# Patient Record
Sex: MALE | Race: WHITE
[De-identification: names, ages, dates, MRNs, and addresses within clinical notes are randomized per-mention and may not be internally consistent; named-entity substitution may affect disease eponyms.]

---

## 2018-03-08 ENCOUNTER — HOSPITAL ENCOUNTER (EMERGENCY)
Dept: HOSPITAL 62 - ER | Age: 63
Discharge: HOME | End: 2018-03-08
Payer: OTHER GOVERNMENT

## 2018-03-08 VITALS — SYSTOLIC BLOOD PRESSURE: 124 MMHG | DIASTOLIC BLOOD PRESSURE: 86 MMHG

## 2018-03-08 DIAGNOSIS — E11.9: ICD-10-CM

## 2018-03-08 DIAGNOSIS — R05: ICD-10-CM

## 2018-03-08 DIAGNOSIS — J84.89: Primary | ICD-10-CM

## 2018-03-08 DIAGNOSIS — Z99.81: ICD-10-CM

## 2018-03-08 LAB
ADD MANUAL DIFF: NO
ANION GAP SERPL CALC-SCNC: 17 MMOL/L (ref 5–19)
BASOPHILS # BLD AUTO: 0.1 10^3/UL (ref 0–0.2)
BASOPHILS NFR BLD AUTO: 0.7 % (ref 0–2)
BUN SERPL-MCNC: 19 MG/DL (ref 7–20)
CALCIUM: 9.5 MG/DL (ref 8.4–10.2)
CHLORIDE SERPL-SCNC: 98 MMOL/L (ref 98–107)
CO2 SERPL-SCNC: 26 MMOL/L (ref 22–30)
EOSINOPHIL # BLD AUTO: 0.3 10^3/UL (ref 0–0.6)
EOSINOPHIL NFR BLD AUTO: 4 % (ref 0–6)
ERYTHROCYTE [DISTWIDTH] IN BLOOD BY AUTOMATED COUNT: 14.7 % (ref 11.5–14)
GLUCOSE SERPL-MCNC: 155 MG/DL (ref 75–110)
HCT VFR BLD CALC: 35.3 % (ref 37.9–51)
HGB BLD-MCNC: 11.7 G/DL (ref 13.5–17)
LYMPHOCYTES # BLD AUTO: 1.2 10^3/UL (ref 0.5–4.7)
LYMPHOCYTES NFR BLD AUTO: 17 % (ref 13–45)
MCH RBC QN AUTO: 27.2 PG (ref 27–33.4)
MCHC RBC AUTO-ENTMCNC: 33.3 G/DL (ref 32–36)
MCV RBC AUTO: 82 FL (ref 80–97)
MONOCYTES # BLD AUTO: 0.8 10^3/UL (ref 0.1–1.4)
MONOCYTES NFR BLD AUTO: 11.7 % (ref 3–13)
NEUTROPHILS # BLD AUTO: 4.7 10^3/UL (ref 1.7–8.2)
NEUTS SEG NFR BLD AUTO: 66.6 % (ref 42–78)
PLATELET # BLD: 327 10^3/UL (ref 150–450)
POTASSIUM SERPL-SCNC: 3.5 MMOL/L (ref 3.6–5)
RBC # BLD AUTO: 4.32 10^6/UL (ref 4.35–5.55)
SODIUM SERPL-SCNC: 140.5 MMOL/L (ref 137–145)
TOTAL CELLS COUNTED % (AUTO): 100 %
WBC # BLD AUTO: 7.1 10^3/UL (ref 4–10.5)

## 2018-03-08 PROCEDURE — 87040 BLOOD CULTURE FOR BACTERIA: CPT

## 2018-03-08 PROCEDURE — 99285 EMERGENCY DEPT VISIT HI MDM: CPT

## 2018-03-08 PROCEDURE — 71046 X-RAY EXAM CHEST 2 VIEWS: CPT

## 2018-03-08 PROCEDURE — 93005 ELECTROCARDIOGRAM TRACING: CPT

## 2018-03-08 PROCEDURE — 80048 BASIC METABOLIC PNL TOTAL CA: CPT

## 2018-03-08 PROCEDURE — 94640 AIRWAY INHALATION TREATMENT: CPT

## 2018-03-08 PROCEDURE — 93010 ELECTROCARDIOGRAM REPORT: CPT

## 2018-03-08 PROCEDURE — 36415 COLL VENOUS BLD VENIPUNCTURE: CPT

## 2018-03-08 PROCEDURE — 85025 COMPLETE CBC W/AUTO DIFF WBC: CPT

## 2018-03-08 NOTE — ER DOCUMENT REPORT
ED Medical Screen (RME)





- General


Chief Complaint: Shortness Of Breath


Stated Complaint: COUGH


Time Seen by Provider: 03/08/18 11:35


Notes: 





RME DISCLOSURE


I have seen this patient as part of a Rapid Medical Evaluation and, if 

applicable, placed any initially appropriate orders. The patient will be seen 

and fully evaluated, including a full history and physical exam, by a provider (

in Main ED or Fast Track) when a room becomes available.





------------------------------------------------------------------





62-year-old male PMH interstitial pneumonitis (on CellCept therapy and daily 

prednisone) here with complaints of cough, shortness of breath, wheezing, fevers

/chills, generalized weakness ongoing for the past 1-2 weeks and progressively 

worsening.  He does wear home oxygen for his interstitial pneumonitis.  He does 

also have a history of pneumonia.  He is primarily here today because his cough 

has become significantly worse in the past few days.





EXAM


Mild to moderate diffuse end expiratory wheezes bilaterally


Mildly diminished aeration


Tachycardic, low 100s


TRAVEL OUTSIDE OF THE [place] IN LAST 30 DAYS: No





- Related Data


Allergies/Adverse Reactions: 


 





No Known Allergies Allergy (Verified 03/08/18 11:24)


 











Past Medical History





- Social History


Chew tobacco use (# tins/day): No


Frequency of alcohol use: None


Drug Abuse: None


Endocrine Medical History: Reports: Hx Diabetes Mellitus Type 2


Renal/ Medical History: Denies: Hx Peritoneal Dialysis


GI Medical History: Reports: Hx Gastroesophageal Reflux Disease


Musculoskeltal Medical History: Reports Hx Arthritis


Past Surgical History: Reports: Hx Cardiac Catheterization, Hx Cardiac Surgery 

- ablasion





- Immunizations


Immunizations up to date: Yes


Hx Diphtheria, Pertussis, Tetanus Vaccination: Yes





Physical Exam





- Vital signs


Vitals: 





 











Temp Pulse Resp BP Pulse Ox


 


 97.8 F   109 H  18   148/72 H  97 


 


 03/08/18 11:30  03/08/18 11:30  03/08/18 11:30  03/08/18 11:30  03/08/18 11:30














Course





- Vital Signs


Vital signs: 





 











Temp Pulse Resp BP Pulse Ox


 


 97.8 F   109 H  18   148/72 H  97 


 


 03/08/18 11:30  03/08/18 11:30  03/08/18 11:30  03/08/18 11:30  03/08/18 11:30

## 2018-03-08 NOTE — RADIOLOGY REPORT (SQ)
EXAM DESCRIPTION:  CHEST PA/LAT



COMPLETED DATE/TIME:  3/8/2018 12:20 pm



REASON FOR STUDY:  cough fever; eval pneumonia



COMPARISON:  5/27/2014.



EXAM PARAMETERS:  NUMBER OF VIEWS: two views

TECHNIQUE: Digital Frontal and Lateral radiographic views of the chest acquired.

RADIATION DOSE: NA

LIMITATIONS: none



FINDINGS:  LUNGS AND PLEURA: Chronic interstitial changes.  Stable mild focal scarring in the right m
id lung.  Pleural thickening in the apices.  No focal infiltrates, masses or pneumothorax. No pleural
 effusion.

MEDIASTINUM AND HILAR STRUCTURES: No masses or contour abnormalities.

HEART AND VASCULAR STRUCTURES: Heart normal size.  No evidence for failure.

BONES: No acute findings.

HARDWARE: None in the chest.

OTHER: No other significant finding.



IMPRESSION:  STABLE CHRONIC PLEURAL AND PARENCHYMAL SCARRING.  NO ACUTE RADIOGRAPHIC FINDING IN THE C
HEST.



TECHNICAL DOCUMENTATION:  JOB ID:  [id number]

 2011 [employer]- All Rights Reserved



Reading location - IP/workstation name: [email or web address]

## 2018-03-08 NOTE — EKG REPORT
SEVERITY:- ABNORMAL ECG -

SINUS RHYTHM

INCOMPLETE RIGHT BUNDLE BRANCH BLOCK

:

Confirmed by: Cathy Hernandez 08-Mar-2018 22:06:41

## 2018-03-08 NOTE — ER DOCUMENT REPORT
ED General





- General


Chief Complaint: Shortness Of Breath


Stated Complaint: COUGH


Time Seen by Provider: 03/08/18 11:35


TRAVEL OUTSIDE OF THE [place] IN LAST 30 DAYS: No





- HPI


Patient complains to provider of: coughing


Notes: 





62-year-old male with chronic interstitial pneumonitis, chronically wearing 

oxygen presents with cough for 2 weeks.  This cough is been increasing in 

intensity over the last 10 days.  Patient also has recent exposure to a young 

child with pneumonia.  Patient denies fever chills chest pain, nausea, vomiting.





- Related Data


Allergies/Adverse Reactions: 


 





No Known Allergies Allergy (Verified 03/08/18 11:24)


 











Past Medical History





- Social History


Smoking Status: Never Smoker


Chew tobacco use (# tins/day): No


Frequency of alcohol use: None


Drug Abuse: None


Family History: Reviewed & Not Pertinent


Patient has suicidal ideation: No


Patient has homicidal ideation: No


Endocrine Medical History: Reports: Hx Diabetes Mellitus Type 2


Renal/ Medical History: Denies: Hx Peritoneal Dialysis


GI Medical History: Reports: Hx Gastroesophageal Reflux Disease


Musculoskeltal Medical History: Reports Hx Arthritis


Past Surgical History: Reports: Hx Cardiac Catheterization, Hx Cardiac Surgery 

- ablasion





- Immunizations


Immunizations up to date: Yes


Hx Diphtheria, Pertussis, Tetanus Vaccination: Yes





Review of Systems





- Review of Systems


Notes: 





REVIEW OF SYSTEMS:


CONSTITUTIONAL: -fevers, -chills


EENT: -eye pain, -difficulty swallowing, -nasal congestion


CARDIOVASCULAR: -chest pain, -syncope.


RESPIRATORY: Increased cough


GASTROINTESTINAL: -abdominal pain, -nausea, -vomiting, -diarrhea


GENITOURINARY: -dysuria, -hematuria


MUSCULOSKELETAL: -back pain, -neck pain


SKIN: -rash or skin lesions.


HEMATOLOGIC: -easy bruising or bleeding.


LYMPHATIC: -swollen, enlarged glands.


NEUROLOGICAL: -altered mental status or loss of consciousness, -headache, -

neurologic symptoms


PSYCHIATRIC: -anxiety, -depression.


ALL OTHER SYSTEMS REVIEWED AND NEGATIVE.





Physical Exam





- Vital signs


Vitals: 


 











Temp Pulse Resp BP Pulse Ox


 


 97.8 F   109 H  18   148/72 H  97 


 


 03/08/18 11:30  03/08/18 11:30  03/08/18 11:30  03/08/18 11:30  03/08/18 11:30














- Notes


Notes: 





PHYSICAL EXAMINATION:


GENERAL: Well-appearing, well-nourished and in no acute distress.


HEAD: Atraumatic, normocephalic.


EYES: Pupils equal round and reactive to light, extraocular movements intact, 

sclera anicteric, conjunctiva are normal.


ENT: nares patent, oropharynx clear without exudates.  Moist mucous membranes.


NECK: Normal range of motion, supple without lymphadenopathy


LUNGS: Breath sounds clear to auscultation bilaterally and equal.  No wheezes 

rales or rhonchi.


HEART: Regular rate and rhythm without murmurs


ABDOMEN: Soft, nontender, normoactive bowel sounds.  No guarding, no rebound.  

No masses appreciated.


EXTREMITIES: Normal range of motion, no pitting or edema.  No cyanosis.


NEUROLOGICAL: Cranial nerves grossly intact.  Normal speech, normal gait.  

Normal sensory and motor exams.


PSYCH: Normal mood, normal affect.


SKIN: Warm, Dry, normal turgor, no rashes or lesions noted.





Course





- Re-evaluation


Re-evalutation: 





03/08/18 12:45


Well-appearing man in no acute distress stable vital signs within normal 

limits.  Extensive lab workup unremarkable, negative for leukocytosis.





Chest x-ray is no focal infiltrate patient was concern he had a pneumonia.  

Near his baseline oxygen demand.  Will be discharged home follow-up strict 

return because





- Vital Signs


Vital signs: 


 











Temp Pulse Resp BP Pulse Ox


 


 97.8 F   109 H  18   148/72 H  97 


 


 03/08/18 11:30  03/08/18 11:30  03/08/18 11:30  03/08/18 11:30  03/08/18 11:30














- Laboratory


Result Diagrams: 


 03/08/18 11:55





 03/08/18 11:55


Laboratory results interpreted by me: 


 











  03/08/18 03/08/18





  11:55 11:55


 


RBC  4.32 L 


 


Hgb  11.7 L 


 


Hct  35.3 L 


 


RDW  14.7 H 


 


Potassium   3.5 L


 


Glucose   155 H














Discharge





- Discharge


Clinical Impression: 


 Cough





Condition: Good


Instructions:  Cough Suppressant & Expectorant Medications


Additional Instructions: 


See your PCP

## 2018-03-20 ENCOUNTER — HOSPITAL ENCOUNTER (INPATIENT)
Dept: HOSPITAL 62 - ER | Age: 63
LOS: 4 days | Discharge: ANOTHER HOSPITAL | DRG: 871 | End: 2018-03-24
Attending: INTERNAL MEDICINE | Admitting: INTERNAL MEDICINE
Payer: OTHER GOVERNMENT

## 2018-03-20 DIAGNOSIS — J15.1: ICD-10-CM

## 2018-03-20 DIAGNOSIS — A41.9: Primary | ICD-10-CM

## 2018-03-20 DIAGNOSIS — R74.8: ICD-10-CM

## 2018-03-20 DIAGNOSIS — I11.0: ICD-10-CM

## 2018-03-20 DIAGNOSIS — J84.9: ICD-10-CM

## 2018-03-20 DIAGNOSIS — M19.90: ICD-10-CM

## 2018-03-20 DIAGNOSIS — Z79.52: ICD-10-CM

## 2018-03-20 DIAGNOSIS — E87.6: ICD-10-CM

## 2018-03-20 DIAGNOSIS — Z79.899: ICD-10-CM

## 2018-03-20 DIAGNOSIS — I50.33: ICD-10-CM

## 2018-03-20 DIAGNOSIS — E66.01: ICD-10-CM

## 2018-03-20 DIAGNOSIS — E11.40: ICD-10-CM

## 2018-03-20 DIAGNOSIS — Z79.4: ICD-10-CM

## 2018-03-20 DIAGNOSIS — Z66: ICD-10-CM

## 2018-03-20 DIAGNOSIS — I27.20: ICD-10-CM

## 2018-03-20 DIAGNOSIS — J96.01: ICD-10-CM

## 2018-03-20 DIAGNOSIS — K21.9: ICD-10-CM

## 2018-03-20 LAB
ADD MANUAL DIFF: NO
ALBUMIN SERPL-MCNC: 3.4 G/DL (ref 3.5–5)
ALP SERPL-CCNC: 123 U/L (ref 38–126)
ALT SERPL-CCNC: 33 U/L (ref 21–72)
ANION GAP SERPL CALC-SCNC: 16 MMOL/L (ref 5–19)
ARTERIAL BLOOD FIO2: (no result)
ARTERIAL BLOOD H2CO3: 0.96 MMOL/L (ref 1.05–1.35)
ARTERIAL BLOOD HCO3: 22.1 MMOL/L (ref 20–26)
ARTERIAL BLOOD PCO2: 31.8 MMHG (ref 35–45)
ARTERIAL BLOOD PH: 7.46 (ref 7.35–7.45)
ARTERIAL BLOOD PO2: 82.6 MMHG (ref 80–100)
ARTERIAL BLOOD TOTAL CO2: 23 MMOL/L (ref 23–27)
AST SERPL-CCNC: 26 U/L (ref 17–59)
BASE EXCESS BLDA CALC-SCNC: -1.1 MMOL/L
BASE EXCESS BLDV CALC-SCNC: 0.7 MMOL/L
BASOPHILS # BLD AUTO: 0.1 10^3/UL (ref 0–0.2)
BASOPHILS NFR BLD AUTO: 0.5 % (ref 0–2)
BILIRUB DIRECT SERPL-MCNC: 0.6 MG/DL (ref 0–0.4)
BILIRUB SERPL-MCNC: 0.8 MG/DL (ref 0.2–1.3)
BUN SERPL-MCNC: 13 MG/DL (ref 7–20)
CALCIUM: 8.8 MG/DL (ref 8.4–10.2)
CHLORIDE SERPL-SCNC: 101 MMOL/L (ref 98–107)
CO2 SERPL-SCNC: 23 MMOL/L (ref 22–30)
EOSINOPHIL # BLD AUTO: 0.2 10^3/UL (ref 0–0.6)
EOSINOPHIL NFR BLD AUTO: 1.3 % (ref 0–6)
ERYTHROCYTE [DISTWIDTH] IN BLOOD BY AUTOMATED COUNT: 15.3 % (ref 11.5–14)
GLUCOSE SERPL-MCNC: 184 MG/DL (ref 75–110)
HCO3 BLDV-SCNC: 24.8 MMOL/L (ref 20–32)
HCT VFR BLD CALC: 30.5 % (ref 37.9–51)
HGB BLD-MCNC: 9.9 G/DL (ref 13.5–17)
LYMPHOCYTES # BLD AUTO: 1 10^3/UL (ref 0.5–4.7)
LYMPHOCYTES NFR BLD AUTO: 5.9 % (ref 13–45)
MCH RBC QN AUTO: 26.6 PG (ref 27–33.4)
MCHC RBC AUTO-ENTMCNC: 32.4 G/DL (ref 32–36)
MCV RBC AUTO: 82 FL (ref 80–97)
MONOCYTES # BLD AUTO: 1.4 10^3/UL (ref 0.1–1.4)
MONOCYTES NFR BLD AUTO: 7.8 % (ref 3–13)
NEUTROPHILS # BLD AUTO: 14.7 10^3/UL (ref 1.7–8.2)
NEUTS SEG NFR BLD AUTO: 84.5 % (ref 42–78)
PCO2 BLDV: 38.3 MMHG (ref 35–63)
PH BLDV: 7.43 [PH] (ref 7.3–7.42)
PLATELET # BLD: 506 10^3/UL (ref 150–450)
POTASSIUM SERPL-SCNC: 3.2 MMOL/L (ref 3.6–5)
PROT SERPL-MCNC: 6.5 G/DL (ref 6.3–8.2)
RBC # BLD AUTO: 3.72 10^6/UL (ref 4.35–5.55)
SAO2 % BLDA: 96.8 % (ref 94–98)
SODIUM SERPL-SCNC: 139.6 MMOL/L (ref 137–145)
TOTAL CELLS COUNTED % (AUTO): 100 %
WBC # BLD AUTO: 17.4 10^3/UL (ref 4–10.5)

## 2018-03-20 PROCEDURE — 87077 CULTURE AEROBIC IDENTIFY: CPT

## 2018-03-20 PROCEDURE — 82962 GLUCOSE BLOOD TEST: CPT

## 2018-03-20 PROCEDURE — 87070 CULTURE OTHR SPECIMN AEROBIC: CPT

## 2018-03-20 PROCEDURE — 85025 COMPLETE CBC W/AUTO DIFF WBC: CPT

## 2018-03-20 PROCEDURE — 36600 WITHDRAWAL OF ARTERIAL BLOOD: CPT

## 2018-03-20 PROCEDURE — 87205 SMEAR GRAM STAIN: CPT

## 2018-03-20 PROCEDURE — 94660 CPAP INITIATION&MGMT: CPT

## 2018-03-20 PROCEDURE — 99285 EMERGENCY DEPT VISIT HI MDM: CPT

## 2018-03-20 PROCEDURE — 87040 BLOOD CULTURE FOR BACTERIA: CPT

## 2018-03-20 PROCEDURE — 83735 ASSAY OF MAGNESIUM: CPT

## 2018-03-20 PROCEDURE — 94640 AIRWAY INHALATION TREATMENT: CPT

## 2018-03-20 PROCEDURE — 83605 ASSAY OF LACTIC ACID: CPT

## 2018-03-20 PROCEDURE — 93306 TTE W/DOPPLER COMPLETE: CPT

## 2018-03-20 PROCEDURE — 83880 ASSAY OF NATRIURETIC PEPTIDE: CPT

## 2018-03-20 PROCEDURE — 84484 ASSAY OF TROPONIN QUANT: CPT

## 2018-03-20 PROCEDURE — 71250 CT THORAX DX C-: CPT

## 2018-03-20 PROCEDURE — 36415 COLL VENOUS BLD VENIPUNCTURE: CPT

## 2018-03-20 PROCEDURE — 80053 COMPREHEN METABOLIC PANEL: CPT

## 2018-03-20 PROCEDURE — 82803 BLOOD GASES ANY COMBINATION: CPT

## 2018-03-20 PROCEDURE — 82565 ASSAY OF CREATININE: CPT

## 2018-03-20 PROCEDURE — 93005 ELECTROCARDIOGRAM TRACING: CPT

## 2018-03-20 PROCEDURE — 71045 X-RAY EXAM CHEST 1 VIEW: CPT

## 2018-03-20 PROCEDURE — 93010 ELECTROCARDIOGRAM REPORT: CPT

## 2018-03-20 PROCEDURE — 96374 THER/PROPH/DIAG INJ IV PUSH: CPT

## 2018-03-20 PROCEDURE — 80202 ASSAY OF VANCOMYCIN: CPT

## 2018-03-20 PROCEDURE — 87186 SC STD MICRODIL/AGAR DIL: CPT

## 2018-03-20 RX ADMIN — FLUOXETINE SCH MG: 20 CAPSULE ORAL at 22:36

## 2018-03-20 RX ADMIN — INSULIN GLARGINE SCH UNIT: 100 INJECTION, SOLUTION SUBCUTANEOUS at 22:38

## 2018-03-20 RX ADMIN — MYCOPHENOLATE MOFETIL SCH MG: 250 CAPSULE ORAL at 22:36

## 2018-03-20 RX ADMIN — SODIUM CHLORIDE PRN ML: 9 INJECTION, SOLUTION INTRAVENOUS at 15:19

## 2018-03-20 RX ADMIN — Medication SCH ML: at 23:04

## 2018-03-20 RX ADMIN — SODIUM CHLORIDE PRN ML: 9 INJECTION, SOLUTION INTRAVENOUS at 14:40

## 2018-03-20 RX ADMIN — MONTELUKAST SODIUM SCH MG: 10 TABLET, FILM COATED ORAL at 22:36

## 2018-03-20 RX ADMIN — CEFEPIME HYDROCHLORIDE SCH ML: 1 INJECTION, SOLUTION INTRAVENOUS at 22:36

## 2018-03-20 NOTE — PDOC H&P
History of Present Illness


Admission Date/PCP: 


  03/20/18 15:29





  


[facility]


Patient complains of: Shortness of breath and cough


History of Present Illness: 


SHALINI PARKER is a 62 year old male presents to hospital with complaint of 

shortness of breath and cough for the last several days.  Wife reports that 

patient was seen at [place] by Dr. Rivera who placed patient on CellCept and 

decrease patient's steroids.  Patient states that by the end of January he 

began having more respiratory issues.  Wife reports that patient's breathing 

continued to worsen into February and recently they came to the emergency room 

for evaluation.  Wife reports that they were told that was most likely due to 

allergies and patient was sent home from the emergency department.  Family 

represented today because of patient not feeling better.  Wife also reported 

that patient has been febrile at home.  Wife reports that temperature has been 

from 100.2F -103.6. Pt states that sat decreased to 75% with exertion. 





Conference call was held with the ER hospitalist and [facility] pulmonary service who 

recommended that patient be transferred to their facility but due to them being 

on diversion recommended that patient stay here in our hospital until bed is 

available.  [place] pulmonary doctor Matthew recommended that patient be placed on 

Bactrim for PCP treatment, steroids, and continue with current antibiotics.








Past Medical History


Endocrine Medical History: Reports: Diabetes Mellitus Type 2 - neuropathy


GI Medical History: Reports: Gastroesophageal Reflux Disease


Musculoskeltal Medical History: Reports: Arthritis





Past Surgical History


Past Surgical History: Reports: Cardiac Catheterization





Social History


Information Source: Patient


Lives with: Family


Smoking Status: Never Smoker


Frequency of Alcohol Use: None


Hx Recreational Drug Use: No


Drugs: None


Hx Prescription Drug Abuse: No





- Advance Directive


Resuscitation Status: Do Not Resuscitate





Family History


Family History: Reviewed & Not Pertinent


Parental Family History Reviewed: Yes


Children Family History Reviewed: Yes


Sibling(s) Family History Reviewed.: Yes





Medication/Allergy


Home Medications: 








Oxycodone HCl/Acetaminophen [Percocet 5-325 mg Tablet] 1 - 2 tab PO ASDIR PRN #

60 tablet 05/27/14 


Oxycodone HCl/Acetaminophen [Percocet 5-325 mg Tablet] 1 - 2 tab PO ASDIR PRN #

15 tablet 10/12/16 








Allergies/Adverse Reactions: 


 





No Known Allergies Allergy (Verified 03/20/18 13:24)


 











Review of Systems


Constitutional: PRESENT: weakness.  ABSENT: chills, fever(s), headache(s), 

weight gain, weight loss


Eyes: ABSENT: visual disturbances


Ears: ABSENT: hearing changes


Cardiovascular: ABSENT: chest pain, dyspnea on exertion, edema, orthropnea, 

palpitations


Respiratory: PRESENT: cough, dyspnea


Gastrointestinal: ABSENT: abdominal pain, constipation, diarrhea, hematemesis, 

hematochezia, nausea, vomiting


Genitourinary: ABSENT: dysuria, hematuria


Musculoskeletal: ABSENT: joint swelling


Integumentary: ABSENT: rash, wounds


Neurological: ABSENT: abnormal gait, abnormal speech, confusion, dizziness, 

focal weakness, syncope


Psychiatric: ABSENT: anxiety, depression, homidical ideation, suicidal ideation


Endocrine: ABSENT: cold intolerance, heat intolerance, polydipsia, polyuria


Hematologic/Lymphatic: ABSENT: easy bleeding, easy bruising





Physical Exam


Vital Signs: 


 











Temp Pulse Resp BP Pulse Ox


 


       30 H  136/66 H  95 


 


       03/20/18 17:00  03/20/18 16:01  03/20/18 17:00











General appearance: PRESENT: morbidly obese, well-developed, well-nourished


Head exam: PRESENT: atraumatic, normocephalic


Eye exam: PRESENT: conjunctiva pink, EOMI.  ABSENT: scleral icterus


Ear exam: PRESENT: normal external ear exam


Mouth exam: PRESENT: moist, tongue midline


Neck exam: ABSENT: carotid bruit, JVD, lymphadenopathy, thyromegaly


Respiratory exam: PRESENT: accessory muscle use, prolonged expiratory phas, 

retraction, rhonchi, tachypnea, unlabored, wheezes


Cardiovascular exam: PRESENT: RRR.  ABSENT: diastolic murmur, rubs, systolic 

murmur


Pulses: PRESENT: normal dorsalis pedis pul


Vascular exam: PRESENT: normal capillary refill


GI/Abdominal exam: PRESENT: normal bowel sounds, soft.  ABSENT: distended, 

guarding, mass, organolmegaly, rebound, tenderness


Rectal exam: PRESENT: deferred


Extremities exam: PRESENT: full ROM.  ABSENT: calf tenderness, clubbing, pedal 

edema


Musculoskeletal exam: PRESENT: full ROM


Neurological exam: PRESENT: alert, awake, oriented to person, oriented to place

, oriented to time, oriented to situation, CN II-XII grossly intact.  ABSENT: 

motor sensory deficit


Psychiatric exam: PRESENT: appropriate affect, normal mood.  ABSENT: homicidal 

ideation, suicidal ideation


Skin exam: PRESENT: dry, intact, warm.  ABSENT: cyanosis, rash





Results


Impressions: 


 





Chest X-Ray  03/20/18 14:21


IMPRESSION:  Chronic bandlike scarring right mid lung


New alveolar and interstitial infiltrates at both lung bases, worrisome for 

alveolar and interstitial edema.  Pneumonia could not entirely be excluded


 














Assessment & Plan





- Diagnosis


(1) Sepsis


Qualifiers: 


   Sepsis type: sepsis due to unspecified organism   Qualified Code(s): A41.9 - 

Sepsis, unspecified organism   


Is this a current diagnosis for this admission?: Yes   


Plan: 


Febrile, respiratory rate, tachypnea, identified source Secondary to Nosocomial 

Pneumonia: Vancomycin and Cefepime.  Will place on Tamiflu. 








(2) Pneumonia


Is this a current diagnosis for this admission?: Yes   


Plan: 


We will continue vancomycin and cefepime.  Patient placed on Bactrim for PCP








(3) Acute respiratory failure with hypoxia


Is this a current diagnosis for this admission?: Yes   


Plan: 


We will check ABG.  Have consulted pulmonary.  Patient on 5 L nasal cannula.  

We will continue patient on antibiotics.








(4) Interstitial pneumonia


Is this a current diagnosis for this admission?: Yes   


Plan: 


Interstitial Pneumonitis:  Will continue Steroids and Cellcept.  Will place on 

Bactrim treatment dose for PJP. 








(5) Morbid obesity


Is this a current diagnosis for this admission?: Yes   


Plan: 


Encourage dietary changes. 








(6) Hypokalemia


Is this a current diagnosis for this admission?: Yes   


Plan: 


Will give potassium replacement.  Will check BMP in am








(7) Diabetes


Is this a current diagnosis for this admission?: Yes   


Plan: 


Will place on SSI. 








(8) DVT prophylaxis


Is this a current diagnosis for this admission?: Yes   


Plan: 


SCDs








- Time


Time Spent: 30 to 50 Minutes

## 2018-03-20 NOTE — RADIOLOGY REPORT (SQ)
EXAM DESCRIPTION:  CT CHEST WITHOUT



COMPLETED DATE/TIME:  3/20/2018 7:01 pm



REASON FOR STUDY:  Pneumonia



COMPARISON:  Radiograph 3/20/2018



TECHNIQUE:  CT scan performed of the chest without intravenous contrast.  Images reviewed with lung, 
soft tissue and bone windows.  Reconstructed coronal and sagittal MPR images reviewed.  All images st
ored on PACS.

All CT scanners at this facility use dose modulation, iterative reconstruction, and/or weight based d
osing when appropriate to reduce radiation dose to as low as reasonably achievable (ALARA).

[facility]: Dose Right  [facility]: CareDose    [facility]: Dose Right    [facility]: Teradose 4D    [facility]: Smart Technologies



RADIATION DOSE:  CT Rad equipment meets quality standard of care and radiation dose reduction techniq
ues were employed. CTDIvol: 17.9 mGy. DLP: 701 mGy-cm. mGy.



LIMITATIONS:  No technical limitations.



FINDINGS:  LUNGS AND PLEURA: There is mild ground-glass opacification in left upper lobe and in the r
ight upper lobe.  More prominent infiltrates are present in the lower lobes, particularly on the righ
t side.  Air bronchograms are seen.

HILAR AND MEDIASTINAL STRUCTURES: Mild mediastinal adenopathy, likely reactive.

HEART AND VASCULAR STRUCTURES: No aneurysm.  No pericardial effusion.

UPPER ABDOMEN: No significant findings.  Limited exam.

THYROID AND OTHER SOFT TISSUES: No masses.  No adenopathy.

BONES: No significant finding.

HARDWARE: None in the chest.

OTHER: No other significant findings.



IMPRESSION:  Multicentric pneumonia most prominently involving the right lower lobe.  Mild mediastina
l adenopathy.



TECHNICAL DOCUMENTATION:  JOB ID:  [id number]

Quality ID # [id number]: Final reports with documentation of one or more dose reduction techniques (e.g., Au
tomated exposure control, adjustment of the mA and/or kV according to patient size, use of iterative 
reconstruction technique)

 2011 [employer]- All Rights Reserved



Reading location - IP/workstation name: [email or web address]

## 2018-03-20 NOTE — RADIOLOGY REPORT (SQ)
EXAM DESCRIPTION:  CHEST SINGLE VIEW



COMPLETED DATE/TIME:  3/20/2018 2:56 pm



REASON FOR STUDY:  fever, tachy, productive cough, hx interstitial pn



COMPARISON:  Chest films 3/8/2018, 5/27/2014



EXAM PARAMETERS:  NUMBER OF VIEWS: One view.

TECHNIQUE: Single frontal radiographic view of the chest acquired.

RADIATION DOSE: NA

LIMITATIONS: None.



FINDINGS:  LUNGS AND PLEURA: There is bilateral lower lobe airspace disease with Kerley lines worriso
me for fluid overload or congestive failure.

Patient has bandlike scarring in the right mid lung along the minor fissure, stable.

No pleural effusion.  No pneumothorax.

MEDIASTINUM AND HILAR STRUCTURES: No masses.  Contour normal.

HEART AND VASCULAR STRUCTURES: No cardiomegaly

BONES: No acute findings.

HARDWARE: None in the chest.

OTHER: No other significant finding.



IMPRESSION:  Chronic bandlike scarring right mid lung

New alveolar and interstitial infiltrates at both lung bases, worrisome for alveolar and interstitial
 edema.  Pneumonia could not entirely be excluded



TECHNICAL DOCUMENTATION:  JOB ID:  [id number]

 2011 [employer]- All Rights Reserved



Reading location - IP/workstation name: [facility]

## 2018-03-20 NOTE — ER DOCUMENT REPORT
ED General





- General


Chief Complaint: Productive Cough


Stated Complaint: FEVER COUGH


Time Seen by Provider: 03/20/18 13:51


Mode of Arrival: Wheelchair


Information source: Patient, Relative


Notes: 





62-year-old male history of interstitial pneumonitis who has been on steroids 

for the past year presents with 3 week duration of difficulty breathing.  

Patient was seen here a few weeks prior told he may be allergic since he was 

afebrile at that time, patient notes that he is normally at 2-3 L baseline 

nasal cannula but has gone up to 5 L recently has been having fevers and 

continued productive green cough





family notes when ambulating sats go down ot 75%


TRAVEL OUTSIDE OF THE [place] IN LAST 30 DAYS: No





- HPI


Onset: Other


Onset/Duration: Persistent, Worse


Quality of pain: Achy


Severity: Moderate


Pain Level: 1


Associated symptoms: Productive cough, Fever, Nausea, Shortness of breath


Exacerbated by: Walking


Relieved by: Denies


Similar symptoms previously: Yes


Recently seen / treated by doctor: Yes





- Related Data


Allergies/Adverse Reactions: 


 





No Known Allergies Allergy (Verified 03/20/18 13:24)


 











Past Medical History





- General


Information source: Patient





- Social History


Smoking Status: Never Smoker


Cigarette use (# per day): No


Chew tobacco use (# tins/day): No


Smoking Education Provided: No


Family History: Reviewed & Not Pertinent


Patient has suicidal ideation: No


Patient has homicidal ideation: No


Endocrine Medical History: Reports: Hx Diabetes Mellitus Type 2 - neuropathy


Renal/ Medical History: Denies: Hx Peritoneal Dialysis


GI Medical History: Reports: Hx Gastroesophageal Reflux Disease


Musculoskeltal Medical History: Reports Hx Arthritis


Past Surgical History: Reports: Hx Cardiac Catheterization, Hx Cardiac Surgery 

- ablasion





- Immunizations


Immunizations up to date: Yes


Hx Diphtheria, Pertussis, Tetanus Vaccination: Yes





Review of Systems





- Review of Systems


Notes: 





REVIEW OF SYSTEMS:


CONSTITUTIONAL : Admits to fevers


EENT:   Denies eye, ear, throat, or mouth pain or symptoms.  Denies nasal or 

sinus congestion or discharge.  Denies throat, tongue, or mouth swelling or 

difficulty swallowing.


CARDIOVASCULAR:  Denies chest pain.  Denies palpitations or racing or irregular 

heart beat.  Denies ankle edema.


RESPIRATORY: Admits to cough congestion productive shortness of breath


GASTROINTESTINAL:  Denies abdominal pain or distention.  Denies nausea, vomiting

, or diarrhea.  Denies blood in vomitus, stools, or per rectum.  Denies black, 

tarry stools.  Denies constipation.  


GENITOURINARY:  Denies difficulty urinating, painful urination, burning, 

frequency, blood in urine, or discharge.


MUSCULOSKELETAL:  Denies back or neck pain or stiffness.  Denies joint pain or 

swelling.


SKIN:   Denies rash, lesions or sores.


HEMATOLOGIC :   Denies easy bruising or bleeding.


LYMPHATIC:  Denies swollen, enlarged glands.


NEUROLOGICAL:  Denies confusion or altered mental status.  Denies passing out 

or loss of consciousness.  Denies dizziness or lightheadedness.  Denies 

headache.  Denies weakness or paralysis or loss of use of either side.  Denies 

problems with gait or speech.  Denies sensory loss, numbness, or tingling.  

Denies seizures.


PSYCHIATRIC:  Denies anxiety or stress.  Denies depression, suicidal ideation, 

or homicidal ideation.





ALL OTHER SYSTEMS REVIEWED AND NEGATIVE.





Dictation was performed using Dragon voice recognition software 





PHYSICAL EXAMINATION:





GENERAL: Overall well-appearing but noted to be febrile





HEAD: Atraumatic, normocephalic.





EYES: Pupils equal round and reactive to light, extraocular movements intact, 

sclera anicteric, conjunctiva are normal.





ENT: Nares patent, oropharynx clear without exudates.  Moist mucous membranes.





NECK: Normal range of motion, supple without lymphadenopathy





LUNGS: Coarse wheezing all throughout





HEART: Tachycardia





ABDOMEN: Soft, nontender, nondistended abdomen.  No guarding, no rebound.  No 

masses appreciated.





Musculoskeletal: Normal range of motion, no pitting or edema.  No cyanosis.





NEUROLOGICAL: Cranial nerves grossly intact.  Normal speech, normal gait.  

Normal sensory, motor exams 





PSYCH: Normal mood, normal affect.





SKIN: Warm, Dry, normal turgor, no rashes or lesions noted.





Course





- Re-evaluation


Re-evalutation: 





03/20/18 14:28


pt overall looks well, but noted to by febrile, tachycardia , hypoxemia on O2, 

will order sepsis workup up, antibitiocs, fluids , imaging pending for probable 

penumona. 





- Laboratory


Result Diagrams: 


 03/20/18 14:41





 03/20/18 14:41


Laboratory results interpreted by me: 


 











  03/20/18 03/20/18 03/20/18





  14:41 14:41 14:41


 


WBC  17.4 H  


 


RBC  3.72 L  


 


Hgb  9.9 L  


 


Hct  30.5 L  


 


MCH  26.6 L  


 


RDW  15.3 H  


 


Plt Count  506 H  


 


Seg Neutrophils %  84.5 H  


 


Lymphocytes %  5.9 L  


 


Absolute Neutrophils  14.7 H  


 


VBG pH    7.43 H


 


Potassium   3.2 L 


 


Glucose   184 H 


 


Direct Bilirubin   0.6 H 


 


Albumin   3.4 L 














- Diagnostic Test


Radiology reviewed: Image reviewed, Reports reviewed - pneumonia





- EKG Interpretation by Me


EKG shows normal: Sinus rhythm, Axis, Intervals, QRS Complexes


Rate: Tachycardia





Critical Care Note





- Critical Care Note


Total time excluding time spent on procedures (mins): 46


Comments: 





46 minutes of critical care time spent in direct contact evaluating and 

reevaluating the patient, treating symptoms, reviewing labs and studies and 

speaking with family  and consultants excluding any procedures





Discharge





- Discharge


Clinical Impression: 


 Hypokalemia





Pneumonia


Qualifiers:


 Pneumonia type: due to unspecified organism Laterality: right Lung location: 

lower lobe of lung Qualified Code(s): J18.1 - Lobar pneumonia, unspecified 

organism





Sepsis


Qualifiers:


 Sepsis type: sepsis due to unspecified organism Qualified Code(s): A41.9 - 

Sepsis, unspecified organism





Condition: Fair


Disposition: ADMITTED AS INPATIENT


Admitting Provider: Hospitalist


Unit Admitted: Telemetry

## 2018-03-20 NOTE — ER DOCUMENT REPORT
ED Medical Screen (RME)





- General


Chief Complaint: Productive Cough


Stated Complaint: FEVER COUGH


Time Seen by Provider: 03/20/18 13:51


Mode of Arrival: Wheelchair


Information source: Patient


Notes: 





This is a 62-year-old oxygen dependent, immune compromised man with 

hypersensitivity pneumonitis (followed at [facility]) who presents to the emergency 

room with fever 102-103, productive cough of brown sputum and oxygen 

desaturations.


TRAVEL OUTSIDE OF THE [place] IN LAST 30 DAYS: No





- Related Data


Allergies/Adverse Reactions: 


 





No Known Allergies Allergy (Verified 03/20/18 13:24)


 











Past Medical History


Endocrine Medical History: Reports: Hx Diabetes Mellitus Type 2 - neuropathy


Renal/ Medical History: Denies: Hx Peritoneal Dialysis


GI Medical History: Reports: Hx Gastroesophageal Reflux Disease


Musculoskeltal Medical History: Reports Hx Arthritis


Past Surgical History: Reports: Hx Cardiac Catheterization, Hx Cardiac Surgery 

- ablasion





- Immunizations


Immunizations up to date: Yes


Hx Diphtheria, Pertussis, Tetanus Vaccination: Yes

## 2018-03-21 LAB
ABSOLUTE LYMPHOCYTES# (MANUAL): 0.9 10^3/UL (ref 0.5–4.7)
ABSOLUTE MONOCYTES # (MANUAL): 1.7 10^3/UL (ref 0.1–1.4)
ABSOLUTE NEUTROPHILS# (MANUAL): 10.3 10^3/UL (ref 1.7–8.2)
ADD MANUAL DIFF: YES
ALBUMIN SERPL-MCNC: 2.6 G/DL (ref 3.5–5)
ALP SERPL-CCNC: 97 U/L (ref 38–126)
ALT SERPL-CCNC: 28 U/L (ref 21–72)
ANION GAP SERPL CALC-SCNC: 9 MMOL/L (ref 5–19)
ANISOCYTOSIS BLD QL SMEAR: SLIGHT
AST SERPL-CCNC: 19 U/L (ref 17–59)
BASOPHILS NFR BLD MANUAL: 0 % (ref 0–2)
BILIRUB DIRECT SERPL-MCNC: 0.4 MG/DL (ref 0–0.4)
BILIRUB SERPL-MCNC: 0.6 MG/DL (ref 0.2–1.3)
BUN SERPL-MCNC: 9 MG/DL (ref 7–20)
CALCIUM: 8.2 MG/DL (ref 8.4–10.2)
CHLORIDE SERPL-SCNC: 110 MMOL/L (ref 98–107)
CO2 SERPL-SCNC: 23 MMOL/L (ref 22–30)
EOSINOPHIL NFR BLD MANUAL: 1 % (ref 0–6)
ERYTHROCYTE [DISTWIDTH] IN BLOOD BY AUTOMATED COUNT: 15.4 % (ref 11.5–14)
GLUCOSE SERPL-MCNC: 142 MG/DL (ref 75–110)
HCT VFR BLD CALC: 26.2 % (ref 37.9–51)
HGB BLD-MCNC: 8.8 G/DL (ref 13.5–17)
MCH RBC QN AUTO: 27.4 PG (ref 27–33.4)
MCHC RBC AUTO-ENTMCNC: 33.4 G/DL (ref 32–36)
MCV RBC AUTO: 82 FL (ref 80–97)
METAMYELOCYTES % (MANUAL): 1 %
MONOCYTES % (MANUAL): 13 % (ref 3–13)
PLATELET # BLD: 423 10^3/UL (ref 150–450)
PLATELET COMMENT: ADEQUATE
POLYCHROMASIA BLD QL SMEAR: SLIGHT
POTASSIUM SERPL-SCNC: 3.6 MMOL/L (ref 3.6–5)
PROT SERPL-MCNC: 5.2 G/DL (ref 6.3–8.2)
RBC # BLD AUTO: 3.2 10^6/UL (ref 4.35–5.55)
ROULEAUX BLD QL SMEAR: (no result)
SEGMENTED NEUTROPHILS % (MAN): 78 % (ref 42–78)
SODIUM SERPL-SCNC: 141.9 MMOL/L (ref 137–145)
TOTAL CELLS COUNTED BLD: 100
TOXIC GRANULES BLD QL SMEAR: (no result)
VARIANT LYMPHS NFR BLD MANUAL: 7 % (ref 13–45)
WBC # BLD AUTO: 13.1 10^3/UL (ref 4–10.5)

## 2018-03-21 PROCEDURE — 3E0F73Z INTRODUCTION OF ANTI-INFLAMMATORY INTO RESPIRATORY TRACT, VIA NATURAL OR ARTIFICIAL OPENING: ICD-10-PCS | Performed by: INTERNAL MEDICINE

## 2018-03-21 RX ADMIN — SULFAMETHOXAZOLE AND TRIMETHOPRIM SCH TAB: 800; 160 TABLET ORAL at 15:24

## 2018-03-21 RX ADMIN — FERROUS SULFATE TAB 325 MG (65 MG ELEMENTAL FE) SCH MG: 325 (65 FE) TAB at 11:17

## 2018-03-21 RX ADMIN — MYCOPHENOLATE MOFETIL SCH MG: 250 CAPSULE ORAL at 21:30

## 2018-03-21 RX ADMIN — INSULIN LISPRO PRN UNIT: 100 INJECTION, SOLUTION INTRAVENOUS; SUBCUTANEOUS at 21:46

## 2018-03-21 RX ADMIN — INSULIN GLARGINE SCH UNIT: 100 INJECTION, SOLUTION SUBCUTANEOUS at 21:33

## 2018-03-21 RX ADMIN — TIOTROPIUM BROMIDE SCH CAP: 18 CAPSULE ORAL; RESPIRATORY (INHALATION) at 09:11

## 2018-03-21 RX ADMIN — MONTELUKAST SODIUM SCH MG: 10 TABLET, FILM COATED ORAL at 21:30

## 2018-03-21 RX ADMIN — VANCOMYCIN HYDROCHLORIDE SCH MG: 1 INJECTION, POWDER, LYOPHILIZED, FOR SOLUTION INTRAVENOUS at 09:11

## 2018-03-21 RX ADMIN — ALBUTEROL SULFATE PRN MG: 2.5 SOLUTION RESPIRATORY (INHALATION) at 08:27

## 2018-03-21 RX ADMIN — INSULIN LISPRO SCH UNIT: 100 INJECTION, SOLUTION INTRAVENOUS; SUBCUTANEOUS at 08:03

## 2018-03-21 RX ADMIN — ALBUTEROL SULFATE PRN MG: 2.5 SOLUTION RESPIRATORY (INHALATION) at 14:27

## 2018-03-21 RX ADMIN — Medication SCH ML: at 21:37

## 2018-03-21 RX ADMIN — FUROSEMIDE SCH MG: 20 TABLET ORAL at 08:01

## 2018-03-21 RX ADMIN — Medication SCH ML: at 13:19

## 2018-03-21 RX ADMIN — GABAPENTIN SCH MG: 300 CAPSULE ORAL at 11:17

## 2018-03-21 RX ADMIN — OSELTAMIVIR PHOSPHATE SCH MG: 75 CAPSULE ORAL at 18:29

## 2018-03-21 RX ADMIN — METHYLPREDNISOLONE SODIUM SUCCINATE SCH MG: 40 INJECTION, POWDER, FOR SOLUTION INTRAMUSCULAR; INTRAVENOUS at 09:09

## 2018-03-21 RX ADMIN — BACLOFEN SCH MG: 10 TABLET ORAL at 18:28

## 2018-03-21 RX ADMIN — CEFEPIME HYDROCHLORIDE SCH ML: 1 INJECTION, SOLUTION INTRAVENOUS at 21:32

## 2018-03-21 RX ADMIN — BACLOFEN SCH MG: 10 TABLET ORAL at 09:10

## 2018-03-21 RX ADMIN — MYCOPHENOLATE MOFETIL SCH MG: 250 CAPSULE ORAL at 09:10

## 2018-03-21 RX ADMIN — Medication SCH ML: at 05:49

## 2018-03-21 RX ADMIN — ALBUTEROL SULFATE PRN MG: 2.5 SOLUTION RESPIRATORY (INHALATION) at 20:54

## 2018-03-21 RX ADMIN — INSULIN LISPRO SCH UNIT: 100 INJECTION, SOLUTION INTRAVENOUS; SUBCUTANEOUS at 11:19

## 2018-03-21 RX ADMIN — SULFAMETHOXAZOLE AND TRIMETHOPRIM SCH TAB: 800; 160 TABLET ORAL at 21:29

## 2018-03-21 RX ADMIN — LANSOPRAZOLE SCH MG: 30 TABLET, ORALLY DISINTEGRATING, DELAYED RELEASE ORAL at 05:49

## 2018-03-21 RX ADMIN — CEFEPIME HYDROCHLORIDE SCH ML: 1 INJECTION, SOLUTION INTRAVENOUS at 10:45

## 2018-03-21 RX ADMIN — VANCOMYCIN HYDROCHLORIDE SCH MG: 1 INJECTION, POWDER, LYOPHILIZED, FOR SOLUTION INTRAVENOUS at 18:29

## 2018-03-21 RX ADMIN — FLUTICASONE PROPIONATE SCH SPRAY: 50 SPRAY, METERED NASAL at 09:10

## 2018-03-21 RX ADMIN — FLUOXETINE SCH MG: 20 CAPSULE ORAL at 21:30

## 2018-03-21 RX ADMIN — VANCOMYCIN HYDROCHLORIDE SCH MG: 1 INJECTION, POWDER, LYOPHILIZED, FOR SOLUTION INTRAVENOUS at 01:19

## 2018-03-21 RX ADMIN — INSULIN LISPRO SCH UNIT: 100 INJECTION, SOLUTION INTRAVENOUS; SUBCUTANEOUS at 18:29

## 2018-03-21 NOTE — EKG REPORT
SEVERITY:- ABNORMAL ECG -

SINUS TACHYCARDIA

INCOMPLETE RIGHT BUNDLE BRANCH BLOCK

:

Confirmed by: Cathy Hernandez 21-Mar-2018 11:03:49

## 2018-03-21 NOTE — XCELERA REPORT
[address] [address]

                             Tel: [phone]

                             Fax: [phone]



                    Transthoracic Echocardiogram Report

____________________________________________________________________________



Name: SHALINI PARKER

MRN: [id number]                Age: 62 yrs

Gender: Male                   : 1955

Patient Status: Inpatient      Patient Location: ICU^1^A

Account #: [id number]

Study Date: 2018 09:35 AM

Accession #: [id number]

____________________________________________________________________________



Height: 75 in        Weight: 246 lb        BSA: 2.4 m2



____________________________________________________________________________

Procedure: A complete two-dimensional transthoracic echocardiogram was

performed (2D, M-mode, spectral and color flow Doppler). The study was

technically difficult with many images being suboptimal in quality.

Reason For Study: Shortness of breath





Ordering Physician: ABBEY COONEY

Performed By: Brittni Whitaker

____________________________________________________________________________





Interpretation Summary

The left ventricular ejection fraction is normal.

Doppler measurements suggest pseudonormalized left ventricular relaxation,

which is associated with grade II/IV or mild to moderate diastolic

dysfunction

There is mild concentric left ventricular hypertrophy.

The left ventricle is grossly normal size.

Wall motion cannot be accurately commented on, but no definite regional

wall motion abnormalities noted.

The right ventricle is moderately dilated.

The right ventricle appears to be hypertrophied

The right ventricular systolic function is borderline reduced.

The right atrium is mildly dilated.

The left atrial size is normal.

There is a trace to mild amount of mitral regurgitation

There is no mitral valve stenosis.

No aortic regurgitation is present.

There is no aortic valve stenosis

There is a trace to mild amount of tricuspid regurgitation

There is mild to moderate pulmonary hypertension by echo

Best estimated RVSP is approximately 40-45 mm/Hg.

The aortic root is not well visualized but is probably normal size.

The inferior vena cava appeared normal and decreased > 50% with respiration

(RAP 5-10 mmHg)

There is no pericardial effusion.



____________________________________________________________________________



MMode/2D Measurements & Calculations

RVDd: 2.7 cm  LVIDd: 4.4 cm  FS: 33.4 %            Ao root diam: 3.3 cm

IVSd: 1.3 cm  LVIDs: 3.0 cm  EDV(Teich): 89.3 ml

              LVPWd: 1.3 cm  ESV(Teich): 33.6 ml   Ao root area: 8.3 cm2

                             EF(Teich): 62.3 %



Doppler Measurements & Calculations

MV E max huey:      MV dec slope:       Ao V2 max:        LV V1 max P.2 cm/sec                            163.4 cm/sec      6.3 mmHg

MV A max huey:      559.4 cm/sec2       Ao max PG:        LV V1 max:

110.4 cm/sec       MV dec time:        10.7 mmHg         125.1 cm/sec

MV E/A: 0.84       0.17 sec



        _____________________________________________________________

MR max huey:        PA V2 max:          TR max huey:

550.4 cm/sec       78.8 cm/sec         257.9 cm/sec

MR max PG:         PA max P.5 mmHg TR max P.2 mmHg                             27.2 mmHg



____________________________________________________________________________

Left Ventricle

The left ventricle is grossly normal size. There is mild concentric left

ventricular hypertrophy. The left ventricular ejection fraction is normal.

Doppler measurements suggest pseudonormalized left ventricular relaxation,

which is associated with grade II/IV or mild to moderate diastolic

dysfunction. Wall motion cannot be accurately commented on, but no definite

regional wall motion abnormalities noted.



Right Ventricle

The right ventricle is moderately dilated. The right ventricle appears to

be hypertrophied. The right ventricular systolic function is borderline

reduced.



Atria

The right atrium is mildly dilated. The left atrial size is normal.

Interarterial septum not well visualized and not well dopplered. Cannot

comment on ASD/PFO presence.





Mitral Valve

The mitral valve is grossly normal. There is no mitral valve stenosis.

There is a trace to mild amount of mitral regurgitation.



Aortic Valve

The aortic valve is grossly normal. There is no aortic valve stenosis. No

aortic regurgitation is present.



Tricuspid Valve

The tricuspid valve is not well visualized secondary to technical

limitations. There is a trace to mild amount of tricuspid regurgitation.

There is mild to moderate pulmonary hypertension by echo. Best estimated

RVSP is approximately 40-45 mm/Hg.



Pulmonic Valve

The pulmonic valve is not well visualized.



Great Vessels

The aortic root is not well visualized but is probably normal size. The

inferior vena cava appeared normal and decreased > 50% with respiration

(RAP 5-10 mmHg).





Effusions

There is no pericardial effusion.



____________________________________________________________________________



Electronically signed by:      Cathy Hernandez      on 2018 01:11 PM



CC: ABBEY COONEY

>

Cathy Hernandez

## 2018-03-21 NOTE — PDOC PROGRESS REPORT
Subjective


Progress Note for:: 03/21/18


Subjective:: 


Pt states that he is feeling a little better this morning.  Spoke to [facility] 

transfer line who states that they are hoping to have a bed later this 

afternoon.





Reason For Visit: 


ACUTE AND CHRONIC HYPOXIC RESPIRATORY FAILURE,








Physical Exam


Vital Signs: 


 











Temp Pulse Resp BP Pulse Ox


 


 99.7 F   124 H  34 H  146/78 H  94 


 


 03/21/18 06:00  03/20/18 20:26  03/21/18 06:00  03/21/18 05:24  03/21/18 06:00








 Intake & Output











 03/20/18 03/21/18 03/22/18





 06:59 06:59 06:59


 


Intake Total  700 


 


Output Total  850 


 


Balance  -150 


 


Weight  111.9 kg 











General appearance: PRESENT: no acute distress, morbidly obese, well-developed, 

well-nourished


Head exam: PRESENT: atraumatic, normocephalic


Eye exam: PRESENT: conjunctiva pink, EOMI.  ABSENT: scleral icterus


Ear exam: PRESENT: normal external ear exam


Mouth exam: PRESENT: moist, tongue midline


Neck exam: ABSENT: carotid bruit, JVD, lymphadenopathy, thyromegaly


Respiratory exam: PRESENT: prolonged expiratory phas, rhonchi, wheezes


Cardiovascular exam: PRESENT: RRR.  ABSENT: diastolic murmur, rubs, systolic 

murmur


Pulses: PRESENT: normal dorsalis pedis pul


Vascular exam: PRESENT: normal capillary refill


GI/Abdominal exam: PRESENT: normal bowel sounds, soft.  ABSENT: distended, 

guarding, mass, organolmegaly, rebound, tenderness


Rectal exam: PRESENT: deferred


Extremities exam: PRESENT: full ROM.  ABSENT: calf tenderness, clubbing, pedal 

edema


Musculoskeletal exam: PRESENT: full ROM


Neurological exam: PRESENT: alert, awake, oriented to person, oriented to place

, oriented to time, oriented to situation, CN II-XII grossly intact.  ABSENT: 

motor sensory deficit


Psychiatric exam: PRESENT: appropriate affect, normal mood.  ABSENT: homicidal 

ideation, suicidal ideation


Skin exam: PRESENT: dry, intact, warm.  ABSENT: cyanosis, rash





Results


Laboratory Results: 


 











  03/20/18





  21:15


 


Carbonic Acid  0.96 L


 


HCO3/H2CO3 Ratio  23:1


 


ABG pH  7.46 H


 


ABG pCO2  31.8 L


 


ABG pO2  82.6


 


ABG HCO3  22.1


 


ABG O2 Saturation  96.8


 


ABG Base Excess  -1.1


 


FiO2  5L








 











  03/20/18 03/21/18





  19:05 01:02


 


Troponin I  0.062  0.056











Impressions: 


 





Chest CT  03/20/18 00:00


IMPRESSION:  Multicentric pneumonia most prominently involving the right lower 

lobe.  Mild mediastinal adenopathy.


 








Chest X-Ray  03/20/18 14:21


IMPRESSION:  Chronic bandlike scarring right mid lung


New alveolar and interstitial infiltrates at both lung bases, worrisome for 

alveolar and interstitial edema.  Pneumonia could not entirely be excluded


 














Assessment & Plan





- Diagnosis


(1) Sepsis


Qualifiers: 


   Sepsis type: sepsis due to unspecified organism   Qualified Code(s): A41.9 - 

Sepsis, unspecified organism   


Is this a current diagnosis for this admission?: Yes   


Plan: 


Febrile, respiratory rate, tachypnea, identified source Secondary to Nosocomial 

Pneumonia: Vancomycin and Cefepime. Will continue Tamiflu. 








(2) Pneumonia


Is this a current diagnosis for this admission?: Yes   


Plan: 


We will continue vancomycin and cefepime.  Patient placed on Bactrim for PCP








(3) Acute respiratory failure with hypoxia


Is this a current diagnosis for this admission?: Yes   


Plan: 


Pulmonary Consulted.  Patient on 5 L nasal cannula.  We will continue patient 

on antibiotics.








(4) Dyspnea


Is this a current diagnosis for this admission?: Yes   


Plan: 


Acute on chronic secondary to interstitial lung disease: We will obtain a 2D 

echo to evaluate patient's heart function.  BMP ordered.  Patient was not 

continued on maintenance IV fluids due to patient receiving 3 L of normal 

saline and emergency room department.








(5) Interstitial pneumonia


Is this a current diagnosis for this admission?: Yes   


Plan: 


Interstitial Pneumonitis:  Will continue current antibiotics.  Will continue 

bactrim for PCP 








(6) Morbid obesity


Is this a current diagnosis for this admission?: Yes   


Plan: 


Encourage dietary changes. 








(7) Hypokalemia


Is this a current diagnosis for this admission?: Yes   


Plan: 


CMP pending. 








(8) Diabetes


Is this a current diagnosis for this admission?: Yes   


Plan: 


Will continue on SSI. 








(9) DVT prophylaxis


Is this a current diagnosis for this admission?: Yes   


Plan: 


SCDs








- Time


Time Spent with patient: 35 or more minutes - Spoke to do transfer line who has 

stated that patient could possibly be transferred to their facility later 

today.  Will wait for further information.  Appreciate Dr. Yash Ying who has 

graciously accepted patient at [facility].

## 2018-03-22 LAB
%HYPO/RBC NFR BLD AUTO: SLIGHT %
ABSOLUTE LYMPHOCYTES# (MANUAL): 0.3 10^3/UL (ref 0.5–4.7)
ABSOLUTE MONOCYTES # (MANUAL): 0.6 10^3/UL (ref 0.1–1.4)
ABSOLUTE NEUTROPHILS# (MANUAL): 13.3 10^3/UL (ref 1.7–8.2)
ADD MANUAL DIFF: YES
ALBUMIN SERPL-MCNC: 2.8 G/DL (ref 3.5–5)
ALP SERPL-CCNC: 110 U/L (ref 38–126)
ALT SERPL-CCNC: 33 U/L (ref 21–72)
ANION GAP SERPL CALC-SCNC: 14 MMOL/L (ref 5–19)
ANISOCYTOSIS BLD QL SMEAR: SLIGHT
AST SERPL-CCNC: 20 U/L (ref 17–59)
BASOPHILS NFR BLD MANUAL: 0 % (ref 0–2)
BILIRUB DIRECT SERPL-MCNC: 0.2 MG/DL (ref 0–0.4)
BILIRUB SERPL-MCNC: 0.2 MG/DL (ref 0.2–1.3)
BUN SERPL-MCNC: 14 MG/DL (ref 7–20)
BURR CELLS BLD QL SMEAR: SLIGHT
CALCIUM: 8.7 MG/DL (ref 8.4–10.2)
CHLORIDE SERPL-SCNC: 106 MMOL/L (ref 98–107)
CO2 SERPL-SCNC: 21 MMOL/L (ref 22–30)
EOSINOPHIL NFR BLD MANUAL: 0 % (ref 0–6)
ERYTHROCYTE [DISTWIDTH] IN BLOOD BY AUTOMATED COUNT: 15.3 % (ref 11.5–14)
GLUCOSE SERPL-MCNC: 136 MG/DL (ref 75–110)
HCT VFR BLD CALC: 26.2 % (ref 37.9–51)
HGB BLD-MCNC: 8.6 G/DL (ref 13.5–17)
MCH RBC QN AUTO: 26.9 PG (ref 27–33.4)
MCHC RBC AUTO-ENTMCNC: 32.9 G/DL (ref 32–36)
MCV RBC AUTO: 82 FL (ref 80–97)
MONOCYTES % (MANUAL): 4 % (ref 3–13)
NEUTS BAND NFR BLD MANUAL: 2 % (ref 3–5)
OVALOCYTES BLD QL SMEAR: SLIGHT
PLATELET # BLD: 403 10^3/UL (ref 150–450)
PLATELET COMMENT: ADEQUATE
POIKILOCYTOSIS BLD QL SMEAR: SLIGHT
POTASSIUM SERPL-SCNC: 3.7 MMOL/L (ref 3.6–5)
PROT SERPL-MCNC: 5.3 G/DL (ref 6.3–8.2)
RBC # BLD AUTO: 3.21 10^6/UL (ref 4.35–5.55)
SEGMENTED NEUTROPHILS % (MAN): 92 % (ref 42–78)
SODIUM SERPL-SCNC: 140.5 MMOL/L (ref 137–145)
STOMATOCYTES BLD QL SMEAR: SLIGHT
TOTAL CELLS COUNTED BLD: 100
VANCOMYCIN,TROUGH: 8.8 UG/ML (ref 5–20)
VARIANT LYMPHS NFR BLD MANUAL: 2 % (ref 13–45)
WBC # BLD AUTO: 14.1 10^3/UL (ref 4–10.5)

## 2018-03-22 RX ADMIN — OSELTAMIVIR PHOSPHATE SCH MG: 75 CAPSULE ORAL at 10:00

## 2018-03-22 RX ADMIN — ALBUTEROL SULFATE PRN MG: 2.5 SOLUTION RESPIRATORY (INHALATION) at 10:29

## 2018-03-22 RX ADMIN — SULFAMETHOXAZOLE AND TRIMETHOPRIM SCH TAB: 800; 160 TABLET ORAL at 22:21

## 2018-03-22 RX ADMIN — MYCOPHENOLATE MOFETIL SCH MG: 250 CAPSULE ORAL at 09:59

## 2018-03-22 RX ADMIN — BACLOFEN SCH MG: 10 TABLET ORAL at 18:07

## 2018-03-22 RX ADMIN — Medication SCH ML: at 22:28

## 2018-03-22 RX ADMIN — MYCOPHENOLATE MOFETIL SCH MG: 250 CAPSULE ORAL at 22:27

## 2018-03-22 RX ADMIN — METHYLPREDNISOLONE SODIUM SUCCINATE SCH MG: 40 INJECTION, POWDER, FOR SOLUTION INTRAMUSCULAR; INTRAVENOUS at 10:02

## 2018-03-22 RX ADMIN — INSULIN LISPRO PRN UNIT: 100 INJECTION, SOLUTION INTRAVENOUS; SUBCUTANEOUS at 22:20

## 2018-03-22 RX ADMIN — Medication SCH ML: at 14:33

## 2018-03-22 RX ADMIN — SULFAMETHOXAZOLE AND TRIMETHOPRIM SCH TAB: 800; 160 TABLET ORAL at 14:12

## 2018-03-22 RX ADMIN — INSULIN LISPRO SCH UNIT: 100 INJECTION, SOLUTION INTRAVENOUS; SUBCUTANEOUS at 11:35

## 2018-03-22 RX ADMIN — FLUOXETINE SCH MG: 20 CAPSULE ORAL at 22:27

## 2018-03-22 RX ADMIN — INSULIN LISPRO SCH UNIT: 100 INJECTION, SOLUTION INTRAVENOUS; SUBCUTANEOUS at 18:09

## 2018-03-22 RX ADMIN — FLUTICASONE PROPIONATE SCH SPRAY: 50 SPRAY, METERED NASAL at 10:00

## 2018-03-22 RX ADMIN — INSULIN GLARGINE SCH UNIT: 100 INJECTION, SOLUTION SUBCUTANEOUS at 22:18

## 2018-03-22 RX ADMIN — VANCOMYCIN HYDROCHLORIDE SCH MG: 1 INJECTION, POWDER, LYOPHILIZED, FOR SOLUTION INTRAVENOUS at 02:28

## 2018-03-22 RX ADMIN — TIOTROPIUM BROMIDE SCH CAP: 18 CAPSULE ORAL; RESPIRATORY (INHALATION) at 10:01

## 2018-03-22 RX ADMIN — SULFAMETHOXAZOLE AND TRIMETHOPRIM SCH TAB: 800; 160 TABLET ORAL at 10:50

## 2018-03-22 RX ADMIN — VANCOMYCIN HYDROCHLORIDE SCH MG: 1 INJECTION, POWDER, LYOPHILIZED, FOR SOLUTION INTRAVENOUS at 18:07

## 2018-03-22 RX ADMIN — FERROUS SULFATE TAB 325 MG (65 MG ELEMENTAL FE) SCH MG: 325 (65 FE) TAB at 11:32

## 2018-03-22 RX ADMIN — VANCOMYCIN HYDROCHLORIDE SCH MG: 1 INJECTION, POWDER, LYOPHILIZED, FOR SOLUTION INTRAVENOUS at 10:53

## 2018-03-22 RX ADMIN — CEFEPIME HYDROCHLORIDE SCH ML: 1 INJECTION, SOLUTION INTRAVENOUS at 22:21

## 2018-03-22 RX ADMIN — INSULIN LISPRO SCH UNIT: 100 INJECTION, SOLUTION INTRAVENOUS; SUBCUTANEOUS at 08:03

## 2018-03-22 RX ADMIN — MONTELUKAST SODIUM SCH MG: 10 TABLET, FILM COATED ORAL at 22:27

## 2018-03-22 RX ADMIN — GABAPENTIN SCH MG: 300 CAPSULE ORAL at 11:32

## 2018-03-22 RX ADMIN — CEFEPIME HYDROCHLORIDE SCH ML: 1 INJECTION, SOLUTION INTRAVENOUS at 09:58

## 2018-03-22 RX ADMIN — SULFAMETHOXAZOLE AND TRIMETHOPRIM SCH TAB: 800; 160 TABLET ORAL at 02:27

## 2018-03-22 RX ADMIN — FUROSEMIDE SCH MG: 20 TABLET ORAL at 08:01

## 2018-03-22 RX ADMIN — Medication SCH ML: at 06:46

## 2018-03-22 RX ADMIN — LANSOPRAZOLE SCH MG: 30 TABLET, ORALLY DISINTEGRATING, DELAYED RELEASE ORAL at 07:45

## 2018-03-22 RX ADMIN — BACLOFEN SCH MG: 10 TABLET ORAL at 09:59

## 2018-03-22 RX ADMIN — ALBUTEROL SULFATE PRN MG: 2.5 SOLUTION RESPIRATORY (INHALATION) at 20:52

## 2018-03-22 RX ADMIN — OSELTAMIVIR PHOSPHATE SCH MG: 75 CAPSULE ORAL at 18:07

## 2018-03-22 NOTE — PHYSICIAN ADVISORY NOTE
Physician Advisor ProgressNote


.: 


Pursuant to the  plan for [facility], I have reviewed the medical record 

for this patient.  





Physician Advisor Statement: 








Please document, if you agree:


1.  "Pneumonia, present on admission, likely due to gram-neg organism or MRSA*; 

Healthcare-acquired, NOT 'nosocomial' pneumonia"


   vs.


    "Nosocomial Pneumonia, likely due to gram-neg organism or MRSA*, not 

present on admission but due to this hospitalization".   (See definitions below)





    *[or what it is that is being tx'd with the vanc & cefepime]





2.  "Chronic Hypoxemic Respiratory Failure due to _______, requiring 2-3L O2 nc 

at baseline"





3.  Chronic dz being tx'd with CellCept & steroids:


      - "Chronic Interstitial Pneumonitis, caused by ______"?  


      - "Chronic Interstitial Lung Dz, caused by _______" ? - or "of unknown 

cause"?





---------------------------------------------------------------------





Please be very precise when using any of these terms:


A.  Community-Acquired Pneumonia (CAP) = present on admission, usual 

community pathogens.


B.  HEALTHCARE-ACQUIRED  pneumonia (HCAP) = due to recent contact w/

healthcare setting, typically present on admission.


C.  HOSPITAL-ACQUIRED pneumonia (HAP) (= also called "nosocomial PNA") = NOT 

present at time of this admission.   Developed 48+ hours after admission (or 

causing readmission within 48 hours).   CAUSED BY THIS ADMISSION.


D.  "Ventilator-Associated pneumonia (VAP) = NOT present at time of this 

admission.  Developed after patient intubated for >48 hours, or within 48 hours 

of extubation.   CAUSED BY THIS ADMISSION.


(If patient may have aspirated before or during intubation and the aspiration 

pneumonia  appearance on CXR is delayed for 1-2 days, please clarify this so it 

is not misclassified as VAP.)








Thanks!


CK

## 2018-03-22 NOTE — PDOC PROGRESS REPORT
Subjective


Progress Note for:: 03/22/18


Subjective:: 





Patient states that his breathing had improved yesterday but today is not as 

good.  Patient states he is able to take deeper breaths and that cough is 

nonproductive.  Nursing states that patient has been pretty stable overnight.  

Nurse states that patient slept in his recliner.


Reason For Visit: 


ACUTE AND CHRONIC HYPOXIC RESPIRATORY FAILURE,








Physical Exam


Vital Signs: 


 











Temp Pulse Resp BP Pulse Ox


 


 97.7 F   67   25 H  142/80 H  96 


 


 03/22/18 08:00  03/22/18 08:25  03/22/18 10:00  03/22/18 08:03  03/22/18 10:00








 Intake & Output











 03/21/18 03/22/18 03/23/18





 06:59 06:59 06:59


 


Intake Total 700 2197 


 


Output Total 850 3000 


 


Balance -150 -803 


 


Weight 111.9 kg 111.8 kg 











General appearance: PRESENT: mild distress, well-developed, well-nourished


Head exam: PRESENT: atraumatic, normocephalic


Eye exam: PRESENT: conjunctiva pink, EOMI.  ABSENT: scleral icterus


Ear exam: PRESENT: normal external ear exam


Mouth exam: PRESENT: moist, tongue midline


Neck exam: ABSENT: carotid bruit, JVD, lymphadenopathy, thyromegaly


Respiratory exam: PRESENT: other - coarse breath sounds, + diminished breath 

sounds


Cardiovascular exam: PRESENT: RRR.  ABSENT: diastolic murmur, rubs, systolic 

murmur


Pulses: PRESENT: normal dorsalis pedis pul


Vascular exam: PRESENT: normal capillary refill


GI/Abdominal exam: PRESENT: normal bowel sounds, soft.  ABSENT: distended, 

guarding, mass, organolmegaly, rebound, tenderness


Rectal exam: PRESENT: deferred


Extremities exam: PRESENT: full ROM, pedal edema, +1 edema.  ABSENT: calf 

tenderness, clubbing


Musculoskeletal exam: PRESENT: full ROM


Neurological exam: PRESENT: alert, awake, oriented to person, oriented to place

, oriented to time, oriented to situation, CN II-XII grossly intact.  ABSENT: 

motor sensory deficit


Psychiatric exam: PRESENT: appropriate affect, normal mood.  ABSENT: homicidal 

ideation, suicidal ideation


Skin exam: PRESENT: dry, intact, warm.  ABSENT: cyanosis, rash





Results


Laboratory Results: 


 





 03/22/18 03:55 





 03/22/18 03:55 





 











  03/22/18 03/22/18





  03:55 03:55


 


WBC  14.1 H 


 


RBC  3.21 L 


 


Hgb  8.6 L 


 


Hct  26.2 L 


 


MCV  82 


 


MCH  26.9 L 


 


MCHC  32.9 


 


RDW  15.3 H 


 


Plt Count  403 


 


Seg Neutrophils %  Not Reportable 


 


Lymphocytes %  Not Reportable 


 


Monocytes %  Not Reportable 


 


Eosinophils %  Not Reportable 


 


Basophils %  Not Reportable 


 


Absolute Neutrophils  Not Reportable 


 


Absolute Lymphocytes  Not Reportable 


 


Absolute Monocytes  Not Reportable 


 


Absolute Eosinophils  Not Reportable 


 


Absolute Basophils  Not Reportable 


 


Sodium   140.5


 


Potassium   3.7


 


Chloride   106


 


Carbon Dioxide   21 L


 


Anion Gap   14


 


BUN   14


 


Creatinine   0.87


 


Est GFR ( Amer)   > 60


 


Est GFR (Non-Af Amer)   > 60


 


Glucose   136 H


 


Calcium   8.7


 


Total Bilirubin   0.2


 


AST   20


 


ALT   33


 


Alkaline Phosphatase   110


 


Total Protein   5.3 L


 


Albumin   2.8 L








 





03/20/18 23:30   Sputum   Gram Stain - Final


03/20/18 23:30   Sputum   Sputum Culture - Final





 











  03/20/18 03/21/18 03/21/18





  19:05 01:02 07:50


 


Troponin I  0.062  0.056  0.044


 


NT-Pro-B Natriuret Pep   














  03/21/18





  07:50


 


Troponin I 


 


NT-Pro-B Natriuret Pep  1270 H











Impressions: 


 





Chest CT  03/20/18 00:00


IMPRESSION:  Multicentric pneumonia most prominently involving the right lower 

lobe.  Mild mediastinal adenopathy.


 








Chest X-Ray  03/20/18 14:21


IMPRESSION:  Chronic bandlike scarring right mid lung


New alveolar and interstitial infiltrates at both lung bases, worrisome for 

alveolar and interstitial edema.  Pneumonia could not entirely be excluded


 














Assessment & Plan





- Diagnosis


(1) Sepsis


Qualifiers: 


   Sepsis type: sepsis due to unspecified organism   Qualified Code(s): A41.9 - 

Sepsis, unspecified organism   


Is this a current diagnosis for this admission?: Yes   


Plan: 


Febrile, respiratory rate, tachypnea, identified source Secondary to Nosocomial 

Pneumonia: Vancomycin and Cefepime. Will continue Tamiflu. 








(2) Acute on chronic diastolic congestive heart failure


Is this a current diagnosis for this admission?: Yes   


Plan: 


We will continue Lasix.  Patient BMP noted to be 1270.  Patient with evidence 

of lower extremity edema at time of admission








(3) Pulmonary hypertension


Is this a current diagnosis for this admission?: Yes   


Plan: 


Moderate pulmonary hypertension: Continue with current treatment.








(4) Pneumonia


Is this a current diagnosis for this admission?: Yes   


Plan: 


We will continue vancomycin and cefepime.  Patient placed on Bactrim for PCP








(5) Acute respiratory failure with hypoxia


Is this a current diagnosis for this admission?: Yes   


Plan: 


Pulmonary Consulted.  Patient on 5 L nasal cannula.  We will continue patient 

on antibiotics.








(6) Dyspnea


Is this a current diagnosis for this admission?: Yes   


Plan: 


Acute on chronic secondary to interstitial lung disease: Patient's 2D echo 

demonstrates pulmonary hypertension moderate and BMP slightly elevated.  Patient

's dyspnea is multifactorial.  Patient will continue on current Lasix regimen.








(7) Interstitial pneumonia


Is this a current diagnosis for this admission?: Yes   


Plan: 


Interstitial Pneumonitis:  Will continue current antibiotics.  Will continue 

bactrim for PCP 








(8) Morbid obesity


Is this a current diagnosis for this admission?: Yes   


Plan: 


Encourage dietary changes. 








(9) Hypokalemia


Is this a current diagnosis for this admission?: Yes   


Plan: 


Resolved.  








(10) Diabetes


Qualifiers: 


   Diabetes mellitus type: type 2   Diabetes mellitus long term insulin use: 

unspecified long term insulin use status   Diabetes mellitus complication status

: with unspecified complications   Qualified Code(s): E11.8 - Type 2 diabetes 

mellitus with unspecified complications   


Is this a current diagnosis for this admission?: Yes   


Plan: 


Will continue on SSI. 








(11) DVT prophylaxis


Is this a current diagnosis for this admission?: Yes   


Plan: 


SCDs

## 2018-03-22 NOTE — EKG REPORT
SEVERITY:- ABNORMAL ECG -

SINUS RHYTHM

NONSPECIFIC INTRAVENTRICULAR CONDUCTION DELAY

MINIMAL ST DEPRESSION, ANTERIOR LEADS

CONSIDER RVH

CONSIDER POST MI

:

Confirmed by: Cathy Hernandez 22-Mar-2018 09:15:25

## 2018-03-22 NOTE — PDOC PROGRESS REPORT
Subjective


Progress Note for:: 03/22/18


Subjective:: 





Patient seems to be doing better with gradual improvement.  Pt is denying any 

chest arm or neck discomfort.  Patient denying any PND, orthopnea.  Patient 

denied any sustained palpitations, dizziness, syncope, near syncope.  Patient 

denying any fever chills.  Patient denying any other significant discomfort.  





Patient is maintaining sinus rhythm.





Review of systems: Rest review of systems negative.





Medications: Medications have been reviewed.


Reason For Visit: 


ACUTE AND CHRONIC HYPOXIC RESPIRATORY FAILURE,








Physical Exam


Vital Signs: 


 











Temp Pulse Resp BP Pulse Ox


 


 98.0 F   100   24 H  152/72 H  91 L


 


 03/22/18 12:00  03/22/18 12:00  03/22/18 12:00  03/22/18 12:00  03/22/18 12:00








 Intake & Output











 03/21/18 03/22/18 03/23/18





 06:59 06:59 06:59


 


Intake Total 700 2197 


 


Output Total 850 3000 


 


Balance -150 -803 


 


Weight 111.9 kg 111.8 kg 











Exam: 








GENERAL: well-nourished and in no acute distress.  Alert and oriented x3


HEAD: Atraumatic, normocephalic.


EYES: Pupils equal round and reactive to light, extraocular movements intact, 

sclera anicteric, conjunctiva are normal.


ENT: TMs normal, nares patent, oropharynx clear without exudates. Moist mucous 

membranes.  No oral ulcerations or bleeding gums noted


NECK: supple without lymphadenopathy.  Trachea is central.  No cervical or 

axillary lymphadenopathy noted.  Carotids are 2+, JVD WNL 


LUNGS: Respiration seems nonlabored, no significant accessory muscle action 

noted.  Bibasilar fine crackles and few a scattered wheezes rales or rhonchi 

noted.  No significant dullness noted on percussion.


CHEST: Palpation of the chest wall shows no significant chest wall tenderness.  

No other significant abnormalities noted.


HEART: [place] NP, No PSH,  1/6 ANDREW aortic area, 1/6 pan systolic murmur mitral 

area, no rubs, no gallops.


ABDOMEN: Soft, no significant tenderness appreciated, normoactive bowel sounds. 

No guarding, no rebound.  No rigidity noted . No masses appreciated.


EXTREMITIES: Pedal pulses are 1-2+, no calf tenderness noted. No clubbing or 

cyanosis.1+ pedal edema noted


NEUROLOGICAL: Focused neurological exam showed no significant neurologic 

deficit. Normal speech, no focal weakness appreciated. 


PSYCH: Normal mood, normal affect.  Judgment and insight within normal limits.


SKIN: No significant ecchymosis, skin is noted to be warm.


MUSCULOSKELETAL EXAM: No significant acute joint swelling noted.





Results


Laboratory Results: 


 





 03/22/18 03:55 





 03/22/18 03:55 





 











  03/22/18 03/22/18





  03:55 03:55


 


WBC  14.1 H 


 


RBC  3.21 L 


 


Hgb  8.6 L 


 


Hct  26.2 L 


 


MCV  82 


 


MCH  26.9 L 


 


MCHC  32.9 


 


RDW  15.3 H 


 


Plt Count  403 


 


Seg Neutrophils %  Not Reportable 


 


Lymphocytes %  Not Reportable 


 


Monocytes %  Not Reportable 


 


Eosinophils %  Not Reportable 


 


Basophils %  Not Reportable 


 


Absolute Neutrophils  Not Reportable 


 


Absolute Lymphocytes  Not Reportable 


 


Absolute Monocytes  Not Reportable 


 


Absolute Eosinophils  Not Reportable 


 


Absolute Basophils  Not Reportable 


 


Sodium   140.5


 


Potassium   3.7


 


Chloride   106


 


Carbon Dioxide   21 L


 


Anion Gap   14


 


BUN   14


 


Creatinine   0.87


 


Est GFR ( Amer)   > 60


 


Est GFR (Non-Af Amer)   > 60


 


Glucose   136 H


 


Calcium   8.7


 


Total Bilirubin   0.2


 


AST   20


 


ALT   33


 


Alkaline Phosphatase   110


 


Total Protein   5.3 L


 


Albumin   2.8 L








 





03/20/18 23:30   Sputum   Gram Stain - Final


03/20/18 23:30   Sputum   Sputum Culture - Final





 











  03/20/18 03/21/18 03/21/18





  19:05 01:02 07:50


 


Troponin I  0.062  0.056  0.044


 


NT-Pro-B Natriuret Pep   














  03/21/18





  07:50


 


Troponin I 


 


NT-Pro-B Natriuret Pep  1270 H











EKG Comments: 





Shows sinus rhythm, possible RVH, minor nonspecific ST segment changes noted


Impressions: 


 





Chest CT  03/20/18 00:00


IMPRESSION:  Multicentric pneumonia most prominently involving the right lower 

lobe.  Mild mediastinal adenopathy.


 








Chest X-Ray  03/20/18 14:21


IMPRESSION:  Chronic bandlike scarring right mid lung


New alveolar and interstitial infiltrates at both lung bases, worrisome for 

alveolar and interstitial edema.  Pneumonia could not entirely be excluded


 














Assessment & Plan





- Diagnosis


(1) Acute respiratory failure with hypoxia


Is this a current diagnosis for this admission?: Yes   





(2) Diabetes


Qualifiers: 


   Diabetes mellitus type: type 2   Diabetes mellitus long term insulin use: 

unspecified long term insulin use status   Diabetes mellitus complication status

: with unspecified complications   Qualified Code(s): E11.8 - Type 2 diabetes 

mellitus with unspecified complications   


Is this a current diagnosis for this admission?: Yes   





(3) Interstitial pneumonia


Is this a current diagnosis for this admission?: Yes   





(4) CHF (congestive heart failure)


Qualifiers: 


   Heart failure type: right-sided   Heart failure chronicity: acute on chronic

   Qualified Code(s): I50.813 - Acute on chronic right heart failure   


Is this a current diagnosis for this admission?: Yes   





(5) Elevated troponin I level


Is this a current diagnosis for this admission?: Yes   





- Notes


Notes: 


Acute respiratory failure: Improved.  Patient on some nasal cannula oxygen 

supplementation.  Most likely related to pneumonia and institution lungs 

disease.  Continue with antibiotic therapy, oxygenation, may consider bilevel 

therapy etc.


CHF: Patient felt to have right-sided CHF.  Improved.  JVP seems down.  Patient 

has peripheral edema, improved.  Continue current diuretic therapy.


Interstitial pneumonia: Patient on antibiotic therapy directed by On license of [facility] physicians and also hospitalist here.


Diabetes: Continue current management plans.


Elevated troponin I: Most likely related to hypoxemia and pneumonia.  Do not 

feel patient has acute coronary syndrome.  








- Time


Time with patient: 15-25 minutes - CODE STATUS was discussed, patient remains 

full code.  Surrogate decision-maker unchanged.  Multiple medical problems were 

addressed.  More than 50% of the time spent coordinating care, discussing 

management plans with involved caregivers.  Management plans discussed with 

involved personnels.  Medical decision making was of moderate to high complexity

, patient's has multiple  comorbidities.


Medications reviewed and adjusted accordingly: Yes

## 2018-03-23 LAB
ADD MANUAL DIFF: NO
ALBUMIN SERPL-MCNC: 3.2 G/DL (ref 3.5–5)
ALP SERPL-CCNC: 108 U/L (ref 38–126)
ALT SERPL-CCNC: 34 U/L (ref 21–72)
ANION GAP SERPL CALC-SCNC: 16 MMOL/L (ref 5–19)
ARTERIAL BLOOD FIO2: (no result)
ARTERIAL BLOOD H2CO3: 1.1 MMOL/L (ref 1.05–1.35)
ARTERIAL BLOOD HCO3: 22.2 MMOL/L (ref 20–26)
ARTERIAL BLOOD PCO2: 36.6 MMHG (ref 35–45)
ARTERIAL BLOOD PH: 7.4 (ref 7.35–7.45)
ARTERIAL BLOOD PO2: 81.7 MMHG (ref 80–100)
ARTERIAL BLOOD TOTAL CO2: 23.3 MMOL/L (ref 23–27)
AST SERPL-CCNC: 21 U/L (ref 17–59)
BASE EXCESS BLDA CALC-SCNC: -2.2 MMOL/L
BASOPHILS # BLD AUTO: 0 10^3/UL (ref 0–0.2)
BASOPHILS NFR BLD AUTO: 0.1 % (ref 0–2)
BILIRUB DIRECT SERPL-MCNC: 0.2 MG/DL (ref 0–0.4)
BILIRUB SERPL-MCNC: 0.2 MG/DL (ref 0.2–1.3)
BUN SERPL-MCNC: 17 MG/DL (ref 7–20)
CALCIUM: 9.1 MG/DL (ref 8.4–10.2)
CHLORIDE SERPL-SCNC: 103 MMOL/L (ref 98–107)
CO2 SERPL-SCNC: 22 MMOL/L (ref 22–30)
EOSINOPHIL # BLD AUTO: 0 10^3/UL (ref 0–0.6)
EOSINOPHIL NFR BLD AUTO: 0.1 % (ref 0–6)
ERYTHROCYTE [DISTWIDTH] IN BLOOD BY AUTOMATED COUNT: 15.8 % (ref 11.5–14)
GLUCOSE SERPL-MCNC: 102 MG/DL (ref 75–110)
HCT VFR BLD CALC: 28.9 % (ref 37.9–51)
HGB BLD-MCNC: 9.4 G/DL (ref 13.5–17)
LYMPHOCYTES # BLD AUTO: 1.6 10^3/UL (ref 0.5–4.7)
LYMPHOCYTES NFR BLD AUTO: 11.6 % (ref 13–45)
MCH RBC QN AUTO: 26.2 PG (ref 27–33.4)
MCHC RBC AUTO-ENTMCNC: 32.5 G/DL (ref 32–36)
MCV RBC AUTO: 81 FL (ref 80–97)
MONOCYTES # BLD AUTO: 1.4 10^3/UL (ref 0.1–1.4)
MONOCYTES NFR BLD AUTO: 10.1 % (ref 3–13)
NEUTROPHILS # BLD AUTO: 10.8 10^3/UL (ref 1.7–8.2)
NEUTS SEG NFR BLD AUTO: 78.1 % (ref 42–78)
PLATELET # BLD: 504 10^3/UL (ref 150–450)
POTASSIUM SERPL-SCNC: 3.7 MMOL/L (ref 3.6–5)
PROT SERPL-MCNC: 5.9 G/DL (ref 6.3–8.2)
RBC # BLD AUTO: 3.59 10^6/UL (ref 4.35–5.55)
SAO2 % BLDA: 96.1 % (ref 94–98)
SODIUM SERPL-SCNC: 141.1 MMOL/L (ref 137–145)
TOTAL CELLS COUNTED % (AUTO): 100 %
VANCOMYCIN,TROUGH: 24.1 UG/ML (ref 5–20)
WBC # BLD AUTO: 13.8 10^3/UL (ref 4–10.5)

## 2018-03-23 RX ADMIN — SULFAMETHOXAZOLE AND TRIMETHOPRIM SCH TAB: 800; 160 TABLET ORAL at 14:28

## 2018-03-23 RX ADMIN — BACLOFEN SCH MG: 10 TABLET ORAL at 17:22

## 2018-03-23 RX ADMIN — Medication SCH ML: at 05:33

## 2018-03-23 RX ADMIN — FLUTICASONE PROPIONATE SCH SPRAY: 50 SPRAY, METERED NASAL at 07:52

## 2018-03-23 RX ADMIN — GABAPENTIN SCH MG: 300 CAPSULE ORAL at 12:23

## 2018-03-23 RX ADMIN — INSULIN GLARGINE SCH UNIT: 100 INJECTION, SOLUTION SUBCUTANEOUS at 21:37

## 2018-03-23 RX ADMIN — FUROSEMIDE SCH MG: 20 TABLET ORAL at 09:22

## 2018-03-23 RX ADMIN — OSELTAMIVIR PHOSPHATE SCH MG: 75 CAPSULE ORAL at 07:59

## 2018-03-23 RX ADMIN — ALBUTEROL SULFATE PRN MG: 2.5 SOLUTION RESPIRATORY (INHALATION) at 04:27

## 2018-03-23 RX ADMIN — VANCOMYCIN HYDROCHLORIDE SCH MG: 1 INJECTION, POWDER, LYOPHILIZED, FOR SOLUTION INTRAVENOUS at 05:32

## 2018-03-23 RX ADMIN — TIOTROPIUM BROMIDE SCH CAP: 18 CAPSULE ORAL; RESPIRATORY (INHALATION) at 07:55

## 2018-03-23 RX ADMIN — BENZONATATE PRN MG: 100 CAPSULE ORAL at 21:32

## 2018-03-23 RX ADMIN — ALBUTEROL SULFATE PRN MG: 2.5 SOLUTION RESPIRATORY (INHALATION) at 14:43

## 2018-03-23 RX ADMIN — BACLOFEN SCH MG: 10 TABLET ORAL at 07:59

## 2018-03-23 RX ADMIN — OSELTAMIVIR PHOSPHATE SCH MG: 75 CAPSULE ORAL at 17:22

## 2018-03-23 RX ADMIN — Medication SCH ML: at 21:32

## 2018-03-23 RX ADMIN — FERROUS SULFATE TAB 325 MG (65 MG ELEMENTAL FE) SCH MG: 325 (65 FE) TAB at 12:23

## 2018-03-23 RX ADMIN — BENZONATATE PRN MG: 100 CAPSULE ORAL at 15:12

## 2018-03-23 RX ADMIN — INSULIN LISPRO SCH UNIT: 100 INJECTION, SOLUTION INTRAVENOUS; SUBCUTANEOUS at 12:48

## 2018-03-23 RX ADMIN — INSULIN LISPRO SCH UNIT: 100 INJECTION, SOLUTION INTRAVENOUS; SUBCUTANEOUS at 17:32

## 2018-03-23 RX ADMIN — Medication SCH ML: at 13:10

## 2018-03-23 RX ADMIN — SULFAMETHOXAZOLE AND TRIMETHOPRIM SCH TAB: 800; 160 TABLET ORAL at 07:53

## 2018-03-23 RX ADMIN — LANSOPRAZOLE SCH MG: 30 TABLET, ORALLY DISINTEGRATING, DELAYED RELEASE ORAL at 05:32

## 2018-03-23 RX ADMIN — MONTELUKAST SODIUM SCH MG: 10 TABLET, FILM COATED ORAL at 21:32

## 2018-03-23 RX ADMIN — VANCOMYCIN HYDROCHLORIDE SCH MG: 1 INJECTION, POWDER, LYOPHILIZED, FOR SOLUTION INTRAVENOUS at 01:28

## 2018-03-23 RX ADMIN — VANCOMYCIN HYDROCHLORIDE SCH MG: 1 INJECTION, POWDER, LYOPHILIZED, FOR SOLUTION INTRAVENOUS at 22:53

## 2018-03-23 RX ADMIN — SULFAMETHOXAZOLE AND TRIMETHOPRIM SCH TAB: 800; 160 TABLET ORAL at 04:19

## 2018-03-23 RX ADMIN — MYCOPHENOLATE MOFETIL SCH MG: 250 CAPSULE ORAL at 21:33

## 2018-03-23 RX ADMIN — INSULIN LISPRO SCH UNIT: 100 INJECTION, SOLUTION INTRAVENOUS; SUBCUTANEOUS at 07:51

## 2018-03-23 RX ADMIN — CEFEPIME HYDROCHLORIDE SCH ML: 1 INJECTION, SOLUTION INTRAVENOUS at 09:25

## 2018-03-23 RX ADMIN — METHYLPREDNISOLONE SODIUM SUCCINATE SCH MG: 40 INJECTION, POWDER, FOR SOLUTION INTRAMUSCULAR; INTRAVENOUS at 08:01

## 2018-03-23 RX ADMIN — MYCOPHENOLATE MOFETIL SCH MG: 250 CAPSULE ORAL at 07:58

## 2018-03-23 RX ADMIN — CEFEPIME HYDROCHLORIDE SCH ML: 1 INJECTION, SOLUTION INTRAVENOUS at 21:31

## 2018-03-23 RX ADMIN — FLUOXETINE SCH MG: 20 CAPSULE ORAL at 21:31

## 2018-03-23 RX ADMIN — VANCOMYCIN HYDROCHLORIDE SCH MG: 1 INJECTION, POWDER, LYOPHILIZED, FOR SOLUTION INTRAVENOUS at 12:51

## 2018-03-23 RX ADMIN — SULFAMETHOXAZOLE AND TRIMETHOPRIM SCH TAB: 800; 160 TABLET ORAL at 21:31

## 2018-03-23 NOTE — RADIOLOGY REPORT (SQ)
EXAM DESCRIPTION: CHEST SINGLE VIEW



CLINICAL HISTORY: pna



COMPARISON: 3/20/2018



FINDINGS: Single frontal view of the chest.  Tortuosity of

thoracic aorta. Cardiomegaly. Stable bibasilar and right midlung

airspace opacity. Bilateral interstitial opacities. No

pneumothorax identified. Leads overlie the chest. No definite

pleural effusion.  No displaced rib fractures identified.  Upper

abdominal soft tissues are unremarkable.



IMPRESSION:



1. Stable appearance of the chest with bibasilar interstitial and

alveolar opacities.

## 2018-03-23 NOTE — PDOC DISCHARGE SUMMARY
General





- Admit/Disc Date/PCP


Admission Date/Primary Care Provider: 


  03/20/18 15:29





  





Discharge Date: 03/23/18





- Discharge Diagnosis


(1) Sepsis


Is this a current diagnosis for this admission?: Yes   


Summary: 


(Fever, respiratory rate, tachypnea, and presumed nosocomial acquired pneumonia

: We will continue vancomycin, cefepime and Bactrim.  Patient is currently on 

Tamiflu for possible flu exposure per recommendations of [facility].  We are unable 

to test for influenza.








(2) Pulmonary hypertension


Is this a current diagnosis for this admission?: Yes   





(3) Acute on chronic diastolic congestive heart failure


Is this a current diagnosis for this admission?: Yes   


Summary: 


Patient was given IV Lasix and diuresed well during hospital stay.  Patient's 

urine output with IV Lasix has been anywhere from 2-3 L of urine.  








(4) Pneumonia


Is this a current diagnosis for this admission?: Yes   


Summary: 


Presumed nosocomial acquired pneumonia: We will continue vancomycin and 

cefepime.  Patient is on Bactrim for PCP treatment.  This is under the 

direction of [facility].








(5) Acute respiratory failure with hypoxia


Is this a current diagnosis for this admission?: Yes   


Summary: 


In setting of chronic hypersensitivity pneumonitis: Patient is continued to 

require 5 L of nasal cannula








(6) Dyspnea


Is this a current diagnosis for this admission?: Yes   


Summary: 


In setting of chronic hypersensitivity pneumonitis and presumed nosocomial 

acquired pneumonia: Patient has been continued on home medications, antibiotics

, and IV steroids








(7) Morbid obesity


Is this a current diagnosis for this admission?: Yes   





(8) Hypokalemia


Is this a current diagnosis for this admission?: Yes   


Summary: 


Resolved.








(9) Diabetes


Is this a current diagnosis for this admission?: Yes   


Summary: 


She has been maintained on sliding scale insulin and long-acting insulin.








(10) Chronic hypersensitivity pneumonitis


Is this a current diagnosis for this admission?: Yes   


Summary: 


Secondary to patient not responding to outpatient treatment plan: Pt continued 

on CellCept.  Patient currently on 40 mg of Solu-Medrol daily.  Patient 

awaiting transfer to [facility] for further recommendations and treatment plan.








(11) DVT prophylaxis


Is this a current diagnosis for this admission?: Yes   


Summary: 


SCDs








- Additional Information


Resuscitation Status: Do Not Resuscitate


Home Medications: 








Albuterol Sulfate [Proair HFA] 2 puff IH Q4HP PRN 03/20/18 


Baclofen [Baclofen 10 mg Tablet] 10 mg PO BID 03/20/18 


Ferrous Sulfate [Iron] 325 mg PO NOON 03/20/18 


Fluoxetine HCl [Prozac 20 mg Capsule] 20 mg PO QHS 03/20/18 


Fluticasone Propionate [Flonase Allergy Relief] 2 spray NASL DAILY 03/20/18 


Furosemide [Lasix 20 mg Tablet] 20 mg PO QAM 03/20/18 


Gabapentin [Neurontin] 300 mg PO NOON 03/20/18 


Insulin Aspart [Novolog Flexpen] 20 unit SUBCUT BIDACBL 03/20/18 


Insulin Aspart [Novolog Flexpen] 22 unit SUBCUT WSUPPER 03/20/18 


Insulin Glargine,Hum.rec.anlog [Lantus Insulin 100 Unit/mL] 24 unit SUBCUT QHS 

03/20/18 


Metformin HCl [Metformin HCl ER] 1,000 mg PO BID 03/20/18 


Montelukast Sodium [Singulair 10 mg Tablet] 10 mg PO QHS 03/20/18 


Mycophenolate Mofetil [Cellcept 250 mg Capsule] 1,000 mg PO Q12 03/20/18 


Pantoprazole Sodium [Protonix] 40 mg PO BID 03/20/18 


Pravastatin Sodium [Pravachol] 20 mg PO QHS 03/20/18 


Prednisone [Deltasone 10 mg Tablet] 10 mg PO NOON 03/20/18 


Sulfamethoxazole/Trimethoprim [Bactrim 400-80 mg Tablet] 2 tab PO MOWEFR@1000 03 /20/18 


Tiotropium Bromide [Spiriva Handihaler 18 mcg/dose (30 Dose)] 1 cap IH DAILY 03/ 20/18 











History of Present Illness


Patient complains of: Shortness of breath


History of Present Illness: 


SHALINI PARKER is a 62 year old male presents to hospital with complaint of 

shortness of breath and cough for the last several days.  Wife reports that 

patient was seen at [facility] by Dr. Rivera who placed patient on CellCept and 

decrease patient's steroids.  Patient states that by the end of January he 

began having more respiratory issues.  Wife reports that patient's breathing 

continued to worsen into February and recently they came to the emergency room 

for evaluation.  Wife reports that they were told that was most likely due to 

allergies and patient was sent home from the emergency department.  Family 

represented today because of patient not feeling better.  Wife also reported 

that patient has been febrile at home.  Wife reports that temperature has been 

from 100.2F -103.6. Pt states that sat decreased to 75% with exertion. 





Conference call was held with the ER hospitalist and [facility] pulmonary service who 

recommended that patient be transferred to their facility but due to them being 

on diversion recommended that patient stay here in our hospital until bed is 

available.  [facility] pulmonary doctor Matthew recommended that patient be placed on 

Bactrim for PCP treatment, steroids, and continue with current antibiotics.








Hospital Course


Hospital Course: 





Patient 62-year-old gentleman that presented to our facility with complaint of 

shortness of breath.  Wife states that in January patient's steroids were 

decreased and patient was placed on CellCept wife reports that patient's 

breathing to the end of January throughout February had continued to worsen.  

Wife reports that patient's breathing has gotten to a point where patient is 

not able to function at home.  When patient arrived in the emergency room he 

was noted to have O2 sat on room air of 75%.  Patient had very coarse breath 

sounds heard in all lung fields.  Patient CT of chest demonstrated multicentric 

pneumonia affecting primarily the right lower lobe.  Spoke to [facility] which is 

where patient receives his care from Dr. Rivera whose attending was able to 

give assistance with patient's care.  Recommendations were that patient 

continue on vancomycin and cefepime.  Patient was placed on Bactrim for dose 

treatment of PCP.  Due to the fact of us not been able to test for flu it was 

recommended that patient was placed on treatment dose for flu.  Patient did 

have a 2D echo that demonstrated normal EF but patient was noted to have 

moderate pulmonary hypertension.  Patient was also noted to have +1 pitting 

edema of lower extremities.  Patient was given diuretics twice IV here during 

hospital stay which resulted and 3 L of urine output for 2 days.  Patient was 

maintained on his home dose of Lasix as well.  Patient's breathing did not 

improve drastically after diuretics.  Patient has demonstrated some improvement 

with breathing when comparing breath sounds from time of admission to now.  

Patient however reports that his breathing has slightly improved but he still 

very short of breath.  Nursing also documented that patient becomes extremely 

winded when he gets up out of recliner and gets into bed.  Sputum demonstrating 

gram-negative rods awaiting final ID and sensitivity.  Accepting physician will 

be Dr. Ying or Dr. Cindy Marrero





Physical Exam


Vital Signs: 


 











Temp Pulse Resp BP Pulse Ox


 


 97.6 F   88   21 H  143/73 H  97 


 


 03/23/18 12:00  03/23/18 10:00  03/23/18 14:00  03/23/18 12:33  03/23/18 14:00








 Intake & Output











 03/22/18 03/23/18 03/24/18





 06:59 06:59 06:59


 


Intake Total 2197 1059 1280


 


Output Total 3000 2030 3150


 


Balance -806 -008 -0843


 


Weight 111.8 kg  











General appearance: PRESENT: mild distress, morbidly obese, well-developed, well

-nourished


Head exam: PRESENT: atraumatic, normocephalic


Eye exam: PRESENT: conjunctiva pink, EOMI.  ABSENT: scleral icterus


Ear exam: PRESENT: normal external ear exam


Mouth exam: PRESENT: moist, tongue midline


Neck exam: ABSENT: carotid bruit, JVD, lymphadenopathy, thyromegaly


Respiratory exam: PRESENT: accessory muscle use, decreased breath sounds, 

prolonged expiratory phas, rales, rhonchi.  ABSENT: wheezes


Cardiovascular exam: PRESENT: RRR.  ABSENT: diastolic murmur, rubs, systolic 

murmur


Pulses: PRESENT: normal dorsalis pedis pul


Vascular exam: PRESENT: normal capillary refill


GI/Abdominal exam: PRESENT: normal bowel sounds, soft.  ABSENT: distended, 

guarding, mass, organolmegaly, rebound, tenderness


Rectal exam: PRESENT: deferred


Extremities exam: PRESENT: pedal edema, +1 edema


Neurological exam: PRESENT: alert, awake, oriented to person, oriented to place

, oriented to time, oriented to situation, CN II-XII grossly intact.  ABSENT: 

motor sensory deficit


Psychiatric exam: PRESENT: appropriate affect, normal mood.  ABSENT: homicidal 

ideation, suicidal ideation


Skin exam: PRESENT: dry, intact, warm.  ABSENT: cyanosis, rash





Results


Laboratory Results: 


 





 03/23/18 04:48 





 03/23/18 11:43 





 











  03/23/18 03/23/18 03/23/18





  04:48 04:48 06:20


 


WBC  13.8 H  


 


RBC  3.59 L  


 


Hgb  9.4 L  


 


Hct  28.9 L  


 


MCV  81  


 


MCH  26.2 L  


 


MCHC  32.5  


 


RDW  15.8 H  


 


Plt Count  504 H  


 


Seg Neutrophils %  78.1 H  


 


Lymphocytes %  11.6 L  


 


Monocytes %  10.1  


 


Eosinophils %  0.1  


 


Basophils %  0.1  


 


Absolute Neutrophils  10.8 H  


 


Absolute Lymphocytes  1.6  


 


Absolute Monocytes  1.4  


 


Absolute Eosinophils  0.0  


 


Absolute Basophils  0.0  


 


Carbonic Acid    1.10


 


HCO3/H2CO3 Ratio    20:1


 


ABG pH    7.40


 


ABG pCO2    36.6


 


ABG pO2    81.7


 


ABG HCO3    22.2


 


ABG O2 Saturation    96.1


 


ABG Base Excess    -2.2


 


FiO2    5L


 


Sodium   141.1 


 


Potassium   3.7 


 


Chloride   103 


 


Carbon Dioxide   22 


 


Anion Gap   16 


 


BUN   17 


 


Creatinine   1.02 


 


Est GFR ( Amer)   > 60 


 


Est GFR (Non-Af Amer)   > 60 


 


Glucose   102 


 


Calcium   9.1 


 


Magnesium   1.8 


 


Total Bilirubin   0.2 


 


AST   21 


 


ALT   34 


 


Alkaline Phosphatase   108 


 


Total Protein   5.9 L 


 


Albumin   3.2 L 














  03/23/18





  11:43


 


WBC 


 


RBC 


 


Hgb 


 


Hct 


 


MCV 


 


MCH 


 


MCHC 


 


RDW 


 


Plt Count 


 


Seg Neutrophils % 


 


Lymphocytes % 


 


Monocytes % 


 


Eosinophils % 


 


Basophils % 


 


Absolute Neutrophils 


 


Absolute Lymphocytes 


 


Absolute Monocytes 


 


Absolute Eosinophils 


 


Absolute Basophils 


 


Carbonic Acid 


 


HCO3/H2CO3 Ratio 


 


ABG pH 


 


ABG pCO2 


 


ABG pO2 


 


ABG HCO3 


 


ABG O2 Saturation 


 


ABG Base Excess 


 


FiO2 


 


Sodium 


 


Potassium 


 


Chloride 


 


Carbon Dioxide 


 


Anion Gap 


 


BUN 


 


Creatinine  1.15


 


Est GFR ( Amer)  > 60


 


Est GFR (Non-Af Amer)  > 60


 


Glucose 


 


Calcium 


 


Magnesium 


 


Total Bilirubin 


 


AST 


 


ALT 


 


Alkaline Phosphatase 


 


Total Protein 


 


Albumin 








 











  03/20/18 03/21/18 03/21/18





  19:05 01:02 07:50


 


Troponin I  0.062  0.056  0.044


 


NT-Pro-B Natriuret Pep   














  03/21/18





  07:50


 


Troponin I 


 


NT-Pro-B Natriuret Pep  1270 H











Impressions: 


 





Chest CT  03/20/18 00:00


IMPRESSION:  Multicentric pneumonia most prominently involving the right lower 

lobe.  Mild mediastinal adenopathy.


 








Chest X-Ray  03/23/18 06:00


IMPRESSION:


 


1. Stable appearance of the chest with bibasilar interstitial and


alveolar opacities.


 


 














Qualifiers





- *


**PATEINT BEING DISCHARGED WITH ANY OF THE FOLLOWING DIAGNOSIS?: No





Plan


Time Spent: Greater than 30 Minutes

## 2018-03-23 NOTE — PDOC PROGRESS REPORT
Subjective


Progress Note for:: 03/23/18


Subjective:: 





Patient states that he feels more short of breath this morning.  Patient states 

that when he lays down he starts coughing more because of nasal drainage.  

Patient states that nasal discharge is green in color.  Patient reports that he 

does not understand why he is having some his nasal drainage given the fact 

that he is on all these antibiotics.  Explained to patient that patient's upper 

respiratory symptoms could be secondary to viral illness.





Phone conversation was held with [place] physician Dr. Cindy Marrero who 

states that she will speak with Dr. Ying about where to place patient at 

[facility]. 


Reason For Visit: 


ACUTE AND CHRONIC HYPOXIC RESPIRATORY FAILURE,








Physical Exam


Vital Signs: 


 











Temp Pulse Resp BP Pulse Ox


 


 97.6 F   92   23 H  156/80 H  92 


 


 03/23/18 04:00  03/23/18 04:35  03/23/18 06:00  03/23/18 04:17  03/23/18 06:00








 Intake & Output











 03/22/18 03/23/18 03/24/18





 06:59 06:59 06:59


 


Intake Total 2197 1059 


 


Output Total 3000 2030 


 


Balance -803 -971 


 


Weight 111.8 kg  











General appearance: PRESENT: mild distress, well-developed, well-nourished


Head exam: PRESENT: atraumatic, normocephalic


Eye exam: PRESENT: conjunctiva pink, EOMI.  ABSENT: scleral icterus


Ear exam: PRESENT: normal external ear exam


Mouth exam: PRESENT: moist, tongue midline


Neck exam: ABSENT: carotid bruit, JVD, lymphadenopathy, thyromegaly


Respiratory exam: PRESENT: accessory muscle use, decreased breath sounds, 

prolonged expiratory phas, wheezes


Cardiovascular exam: PRESENT: RRR.  ABSENT: diastolic murmur, rubs, systolic 

murmur


Pulses: PRESENT: normal dorsalis pedis pul


Vascular exam: PRESENT: normal capillary refill


GI/Abdominal exam: PRESENT: normal bowel sounds, soft.  ABSENT: distended, 

guarding, mass, organolmegaly, rebound, tenderness


Rectal exam: PRESENT: deferred


Extremities exam: ABSENT: calf tenderness, clubbing, pedal edema


Neurological exam: PRESENT: alert, awake, oriented to person, oriented to place

, oriented to time, oriented to situation, CN II-XII grossly intact.  ABSENT: 

motor sensory deficit


Psychiatric exam: PRESENT: agitated


Skin exam: PRESENT: dry, intact, warm.  ABSENT: cyanosis, rash





Results


Laboratory Results: 


 





 03/23/18 04:48 





 03/23/18 04:48 





 











  03/23/18 03/23/18 03/23/18





  04:48 04:48 06:20


 


WBC  13.8 H  


 


RBC  3.59 L  


 


Hgb  9.4 L  


 


Hct  28.9 L  


 


MCV  81  


 


MCH  26.2 L  


 


MCHC  32.5  


 


RDW  15.8 H  


 


Plt Count  504 H  


 


Seg Neutrophils %  78.1 H  


 


Lymphocytes %  11.6 L  


 


Monocytes %  10.1  


 


Eosinophils %  0.1  


 


Basophils %  0.1  


 


Absolute Neutrophils  10.8 H  


 


Absolute Lymphocytes  1.6  


 


Absolute Monocytes  1.4  


 


Absolute Eosinophils  0.0  


 


Absolute Basophils  0.0  


 


Carbonic Acid    1.10


 


HCO3/H2CO3 Ratio    20:1


 


ABG pH    7.40


 


ABG pCO2    36.6


 


ABG pO2    81.7


 


ABG HCO3    22.2


 


ABG O2 Saturation    96.1


 


ABG Base Excess    -2.2


 


FiO2    5L


 


Sodium   141.1 


 


Potassium   3.7 


 


Chloride   103 


 


Carbon Dioxide   22 


 


Anion Gap   16 


 


BUN   17 


 


Creatinine   1.02 


 


Est GFR ( Amer)   > 60 


 


Est GFR (Non-Af Amer)   > 60 


 


Glucose   102 


 


Calcium   9.1 


 


Magnesium   1.8 


 


Total Bilirubin   0.2 


 


AST   21 


 


ALT   34 


 


Alkaline Phosphatase   108 


 


Total Protein   5.9 L 


 


Albumin   3.2 L 








 











  03/20/18 03/21/18 03/21/18





  19:05 01:02 07:50


 


Troponin I  0.062  0.056  0.044


 


NT-Pro-B Natriuret Pep   














  03/21/18





  07:50


 


Troponin I 


 


NT-Pro-B Natriuret Pep  1270 H











Impressions: 


 





Chest CT  03/20/18 00:00


IMPRESSION:  Multicentric pneumonia most prominently involving the right lower 

lobe.  Mild mediastinal adenopathy.


 








Chest X-Ray  03/23/18 06:00


IMPRESSION:


 


1. Stable appearance of the chest with bibasilar interstitial and


alveolar opacities.


 


 














Assessment & Plan





- Diagnosis


(1) Sepsis


Qualifiers: 


   Sepsis type: sepsis due to unspecified organism   Qualified Code(s): A41.9 - 

Sepsis, unspecified organism   


Is this a current diagnosis for this admission?: Yes   


Plan: 


Febrile, respiratory rate, tachypnea, identified source Secondary to Nosocomial 

Pneumonia: Vancomycin and Cefepime. Will continue Tamiflu. 








(2) Acute on chronic diastolic congestive heart failure


Is this a current diagnosis for this admission?: Yes   


Plan: 


Will give patient 40 mg of IV Lasix today.  Will monitor urine output.  Will 

also continue patient's 20 mg of Lasix p.o.








(3) Pulmonary hypertension


Is this a current diagnosis for this admission?: Yes   


Plan: 


Moderate pulmonary hypertension: Continue with current treatment.








(4) Pneumonia


Is this a current diagnosis for this admission?: Yes   


Plan: 


We will continue vancomycin and cefepime.  Patient placed on Bactrim for PCP








(5) Acute respiratory failure with hypoxia


Is this a current diagnosis for this admission?: Yes   


Plan: 


Pulmonary Consulted.  Patient on 5 L nasal cannula.  We will continue patient 

on antibiotics.








(6) Dyspnea


Is this a current diagnosis for this admission?: Yes   


Plan: 


Acute on chronic secondary to interstitial lung disease: Patient's 2D echo 

demonstrates pulmonary hypertension moderate and BMP slightly elevated.  Patient

's dyspnea is multifactorial.  Will give IV Lasix. 








(7) Interstitial pneumonia


Is this a current diagnosis for this admission?: Yes   


Plan: 


Interstitial Pneumonitis:  Will continue current antibiotics.  Will continue 

bactrim for PCP 








(8) Morbid obesity


Is this a current diagnosis for this admission?: Yes   


Plan: 


Encourage dietary changes. 








(9) Hypokalemia


Is this a current diagnosis for this admission?: Yes   


Plan: 


Resolved.  








(10) Diabetes


Qualifiers: 


   Diabetes mellitus type: type 2   Diabetes mellitus long term insulin use: 

unspecified long term insulin use status   Diabetes mellitus complication status

: with unspecified complications   Qualified Code(s): E11.8 - Type 2 diabetes 

mellitus with unspecified complications   


Is this a current diagnosis for this admission?: Yes   


Plan: 


Will continue on SSI. 








(11) DVT prophylaxis


Is this a current diagnosis for this admission?: Yes   


Plan: 


SCDs








- Time


Time Spent with patient: 25-34 minutes

## 2018-03-23 NOTE — PDOC PROGRESS REPORT
Subjective


Progress Note for:: 03/23/18


Subjective:: 





Patient seems to be doing about the same as yesterday.  Still awaiting a bed at 

[facility].  Pt is denying any chest arm or neck discomfort.  

Patient denying any PND, orthopnea.  Patient denied any sustained palpitations, 

dizziness, syncope, near syncope.  Patient denying any fever chills.  Patient 

denying any other significant discomfort.  





Patient is maintaining sinus rhythm.





Review of systems: Rest review of systems negative.





Medications: Medications have been reviewed.


Reason For Visit: 


ACUTE AND CHRONIC HYPOXIC RESPIRATORY FAILURE,








Physical Exam


Vital Signs: 


 











Temp Pulse Resp BP Pulse Ox


 


 97.1 F   94   20   143/73 H  95 


 


 03/23/18 16:00  03/23/18 19:00  03/23/18 14:43  03/23/18 12:33  03/23/18 14:43








 Intake & Output











 03/22/18 03/23/18 03/24/18





 06:59 06:59 06:59


 


Intake Total 2197 1059 1600


 


Output Total 3000 2030 3700


 


Balance -803 -971 -2100


 


Weight 111.8 kg  











Exam: 








GENERAL: well-nourished and in no acute distress.  Alert and oriented x3


HEAD: Atraumatic, normocephalic.


EYES: Pupils equal round and reactive to light, extraocular movements intact, 

sclera anicteric, conjunctiva are normal.


ENT: TMs normal, nares patent, oropharynx clear without exudates. Moist mucous 

membranes.  No oral ulcerations or bleeding gums noted


NECK: supple without lymphadenopathy.  Trachea is central.  No cervical or 

axillary lymphadenopathy noted.  Carotids are 2+, JVD WNL 


LUNGS: Respiration seems nonlabored, bibasilar fine crackles and few a 

scattered wheezes rales or rhonchi noted.  No significant dullness noted on 

percussion.


CHEST: Palpation of the chest wall shows no significant chest wall tenderness.  

No other significant abnormalities noted.


HEART: [place] NP, No PSH,  1/6 ANDREW aortic area, 1/6 pan systolic murmur mitral 

area, no rubs, no gallops.


ABDOMEN: Soft, no significant tenderness appreciated, normoactive bowel sounds. 

No guarding, no rebound.  No rigidity noted . No masses appreciated.


EXTREMITIES: Pedal pulses are 1-2+, no calf tenderness noted. No clubbing or 

cyanosis.1+ pedal edema noted


NEUROLOGICAL: Focused neurological exam showed no significant neurologic 

deficit. Normal speech, no focal weakness appreciated. 


PSYCH: Normal mood, normal affect.  Judgment and insight within normal limits.


SKIN: No significant ecchymosis, skin is noted to be warm.


MUSCULOSKELETAL EXAM: No significant acute joint swelling noted.





Results


Laboratory Results: 


 





 03/23/18 04:48 





 03/23/18 11:43 





 











  03/23/18 03/23/18 03/23/18





  04:48 04:48 06:20


 


WBC  13.8 H  


 


RBC  3.59 L  


 


Hgb  9.4 L  


 


Hct  28.9 L  


 


MCV  81  


 


MCH  26.2 L  


 


MCHC  32.5  


 


RDW  15.8 H  


 


Plt Count  504 H  


 


Seg Neutrophils %  78.1 H  


 


Lymphocytes %  11.6 L  


 


Monocytes %  10.1  


 


Eosinophils %  0.1  


 


Basophils %  0.1  


 


Absolute Neutrophils  10.8 H  


 


Absolute Lymphocytes  1.6  


 


Absolute Monocytes  1.4  


 


Absolute Eosinophils  0.0  


 


Absolute Basophils  0.0  


 


Carbonic Acid    1.10


 


HCO3/H2CO3 Ratio    20:1


 


ABG pH    7.40


 


ABG pCO2    36.6


 


ABG pO2    81.7


 


ABG HCO3    22.2


 


ABG O2 Saturation    96.1


 


ABG Base Excess    -2.2


 


FiO2    5L


 


Sodium   141.1 


 


Potassium   3.7 


 


Chloride   103 


 


Carbon Dioxide   22 


 


Anion Gap   16 


 


BUN   17 


 


Creatinine   1.02 


 


Est GFR ( Amer)   > 60 


 


Est GFR (Non-Af Amer)   > 60 


 


Glucose   102 


 


Calcium   9.1 


 


Magnesium   1.8 


 


Total Bilirubin   0.2 


 


AST   21 


 


ALT   34 


 


Alkaline Phosphatase   108 


 


Total Protein   5.9 L 


 


Albumin   3.2 L 














  03/23/18





  11:43


 


WBC 


 


RBC 


 


Hgb 


 


Hct 


 


MCV 


 


MCH 


 


MCHC 


 


RDW 


 


Plt Count 


 


Seg Neutrophils % 


 


Lymphocytes % 


 


Monocytes % 


 


Eosinophils % 


 


Basophils % 


 


Absolute Neutrophils 


 


Absolute Lymphocytes 


 


Absolute Monocytes 


 


Absolute Eosinophils 


 


Absolute Basophils 


 


Carbonic Acid 


 


HCO3/H2CO3 Ratio 


 


ABG pH 


 


ABG pCO2 


 


ABG pO2 


 


ABG HCO3 


 


ABG O2 Saturation 


 


ABG Base Excess 


 


FiO2 


 


Sodium 


 


Potassium 


 


Chloride 


 


Carbon Dioxide 


 


Anion Gap 


 


BUN 


 


Creatinine  1.15


 


Est GFR ( Amer)  > 60


 


Est GFR (Non-Af Amer)  > 60


 


Glucose 


 


Calcium 


 


Magnesium 


 


Total Bilirubin 


 


AST 


 


ALT 


 


Alkaline Phosphatase 


 


Total Protein 


 


Albumin 








 











  03/20/18 03/21/18 03/21/18





  19:05 01:02 07:50


 


Troponin I  0.062  0.056  0.044


 


NT-Pro-B Natriuret Pep   














  03/21/18





  07:50


 


Troponin I 


 


NT-Pro-B Natriuret Pep  1270 H











Impressions: 


 





Chest CT  03/20/18 00:00


IMPRESSION:  Multicentric pneumonia most prominently involving the right lower 

lobe.  Mild mediastinal adenopathy.


 








Chest X-Ray  03/23/18 06:00


IMPRESSION:


 


1. Stable appearance of the chest with bibasilar interstitial and


alveolar opacities.


 


 














Assessment & Plan





- Diagnosis


(1) Acute respiratory failure with hypoxia


Is this a current diagnosis for this admission?: Yes   





(2) Diabetes


Qualifiers: 


   Diabetes mellitus type: type 2   Diabetes mellitus long term insulin use: 

unspecified long term insulin use status   Diabetes mellitus complication status

: with unspecified complications   Qualified Code(s): E11.8 - Type 2 diabetes 

mellitus with unspecified complications   


Is this a current diagnosis for this admission?: Yes   





(3) Interstitial pneumonia


Is this a current diagnosis for this admission?: Yes   





(4) CHF (congestive heart failure)


Qualifiers: 


   Heart failure type: right-sided   Heart failure chronicity: acute on chronic

   Qualified Code(s): I50.813 - Acute on chronic right heart failure   


Is this a current diagnosis for this admission?: Yes   





(5) Elevated troponin I level


Is this a current diagnosis for this admission?: Yes   





- Notes


Notes: 


Patient generally stable.  At this point recommend continue current management 

plans.  Patient currently seems to be stable respiratory wise on 4 L of nasal 

cannula.


Acute respiratory failure: Improved.  Patient on some nasal cannula oxygen 

supplementation.  Most likely related to pneumonia and interstitial lungs 

disease.  Continue with antibiotic therapy, oxygenation, may consider bilevel 

therapy etc.


CHF: Patient felt to have right-sided CHF.  Improved.  JVP seems down.  Patient 

has peripheral edema, improved.  Continue current diuretic therapy.


Interstitial pneumonia: Patient on antibiotic therapy directed by [facility] physicians and also hospitalist here.


Diabetes: Continue current management plans.


Elevated troponin I: Most likely related to hypoxemia and pneumonia.  Do not 

feel patient has acute coronary syndrome.  








- Time


Time with patient: 15-25 minutes - CODE STATUS was discussed, patient remains 

full code.  Surrogate decision-maker unchanged.  Multiple medical problems were 

addressed.  More than 50% of the time spent coordinating care, discussing 

management plans with involved caregivers.  Management plans discussed with 

involved personnels.  Medical decision making was of moderate to high complexity

, patient's has multiple  comorbidities.


Medications reviewed and adjusted accordingly: Yes

## 2018-03-24 VITALS — SYSTOLIC BLOOD PRESSURE: 152 MMHG | DIASTOLIC BLOOD PRESSURE: 72 MMHG

## 2018-03-24 LAB
ADD MANUAL DIFF: NO
ALBUMIN SERPL-MCNC: 3 G/DL (ref 3.5–5)
ALP SERPL-CCNC: 98 U/L (ref 38–126)
ALT SERPL-CCNC: 25 U/L (ref 21–72)
ANION GAP SERPL CALC-SCNC: 12 MMOL/L (ref 5–19)
AST SERPL-CCNC: 19 U/L (ref 17–59)
BASOPHILS # BLD AUTO: 0 10^3/UL (ref 0–0.2)
BASOPHILS NFR BLD AUTO: 0.2 % (ref 0–2)
BILIRUB DIRECT SERPL-MCNC: 0.2 MG/DL (ref 0–0.4)
BILIRUB SERPL-MCNC: 0.2 MG/DL (ref 0.2–1.3)
BUN SERPL-MCNC: 16 MG/DL (ref 7–20)
CALCIUM: 8.9 MG/DL (ref 8.4–10.2)
CHLORIDE SERPL-SCNC: 101 MMOL/L (ref 98–107)
CO2 SERPL-SCNC: 26 MMOL/L (ref 22–30)
EOSINOPHIL # BLD AUTO: 0 10^3/UL (ref 0–0.6)
EOSINOPHIL NFR BLD AUTO: 0.4 % (ref 0–6)
ERYTHROCYTE [DISTWIDTH] IN BLOOD BY AUTOMATED COUNT: 15.5 % (ref 11.5–14)
GLUCOSE SERPL-MCNC: 96 MG/DL (ref 75–110)
HCT VFR BLD CALC: 27.3 % (ref 37.9–51)
HGB BLD-MCNC: 9 G/DL (ref 13.5–17)
LYMPHOCYTES # BLD AUTO: 1.5 10^3/UL (ref 0.5–4.7)
LYMPHOCYTES NFR BLD AUTO: 13.2 % (ref 13–45)
MCH RBC QN AUTO: 26.7 PG (ref 27–33.4)
MCHC RBC AUTO-ENTMCNC: 32.9 G/DL (ref 32–36)
MCV RBC AUTO: 81 FL (ref 80–97)
MONOCYTES # BLD AUTO: 1.5 10^3/UL (ref 0.1–1.4)
MONOCYTES NFR BLD AUTO: 12.8 % (ref 3–13)
NEUTROPHILS # BLD AUTO: 8.6 10^3/UL (ref 1.7–8.2)
NEUTS SEG NFR BLD AUTO: 73.4 % (ref 42–78)
PLATELET # BLD: 523 10^3/UL (ref 150–450)
POTASSIUM SERPL-SCNC: 3.5 MMOL/L (ref 3.6–5)
PROT SERPL-MCNC: 5.5 G/DL (ref 6.3–8.2)
RBC # BLD AUTO: 3.37 10^6/UL (ref 4.35–5.55)
SODIUM SERPL-SCNC: 138.6 MMOL/L (ref 137–145)
TOTAL CELLS COUNTED % (AUTO): 100 %
WBC # BLD AUTO: 11.7 10^3/UL (ref 4–10.5)

## 2018-03-24 PROCEDURE — 5A09357 ASSISTANCE WITH RESPIRATORY VENTILATION, LESS THAN 24 CONSECUTIVE HOURS, CONTINUOUS POSITIVE AIRWAY PRESSURE: ICD-10-PCS | Performed by: INTERNAL MEDICINE

## 2018-03-24 RX ADMIN — METHYLPREDNISOLONE SODIUM SUCCINATE SCH MG: 40 INJECTION, POWDER, FOR SOLUTION INTRAMUSCULAR; INTRAVENOUS at 09:49

## 2018-03-24 RX ADMIN — TIOTROPIUM BROMIDE SCH CAP: 18 CAPSULE ORAL; RESPIRATORY (INHALATION) at 09:49

## 2018-03-24 RX ADMIN — LANSOPRAZOLE SCH MG: 30 TABLET, ORALLY DISINTEGRATING, DELAYED RELEASE ORAL at 06:12

## 2018-03-24 RX ADMIN — SULFAMETHOXAZOLE AND TRIMETHOPRIM SCH TAB: 800; 160 TABLET ORAL at 08:48

## 2018-03-24 RX ADMIN — MYCOPHENOLATE MOFETIL SCH MG: 250 CAPSULE ORAL at 08:48

## 2018-03-24 RX ADMIN — FUROSEMIDE SCH MG: 20 TABLET ORAL at 08:48

## 2018-03-24 RX ADMIN — CEFEPIME HYDROCHLORIDE SCH ML: 1 INJECTION, SOLUTION INTRAVENOUS at 09:49

## 2018-03-24 RX ADMIN — SULFAMETHOXAZOLE AND TRIMETHOPRIM SCH TAB: 800; 160 TABLET ORAL at 04:12

## 2018-03-24 RX ADMIN — VANCOMYCIN HYDROCHLORIDE SCH MG: 1 INJECTION, POWDER, LYOPHILIZED, FOR SOLUTION INTRAVENOUS at 06:12

## 2018-03-24 RX ADMIN — BACLOFEN SCH MG: 10 TABLET ORAL at 09:49

## 2018-03-24 RX ADMIN — OSELTAMIVIR PHOSPHATE SCH MG: 75 CAPSULE ORAL at 10:07

## 2018-03-24 RX ADMIN — BENZONATATE PRN MG: 100 CAPSULE ORAL at 04:12

## 2018-03-24 RX ADMIN — FLUTICASONE PROPIONATE SCH SPRAY: 50 SPRAY, METERED NASAL at 09:49

## 2018-03-24 RX ADMIN — Medication SCH ML: at 06:12

## 2018-03-24 RX ADMIN — INSULIN LISPRO SCH UNIT: 100 INJECTION, SOLUTION INTRAVENOUS; SUBCUTANEOUS at 09:49

## 2018-03-24 NOTE — PDOC PROGRESS REPORT
Subjective


Progress Note for:: 03/24/18


Subjective:: 





Patient seen on morning rounds and seems improved.  His leg swelling is better.

  Patient now has a got a bed at [facility].  Pt is denying any 

chest arm or neck discomfort.  Patient denying any PND, orthopnea.  Patient 

denied any sustained palpitations, dizziness, syncope, near syncope.  Patient 

denying any fever chills.  Patient denying any other significant discomfort.  





Patient is maintaining sinus rhythm.





Review of systems: Rest review of systems negative.





Medications: Medications have been reviewed.


Reason For Visit: 


ACUTE AND CHRONIC HYPOXIC RESPIRATORY FAILURE,








Physical Exam


Vital Signs: 


 











Temp Pulse Resp BP Pulse Ox


 


 97.6 F   86   22 H  152/72 H  100 


 


 03/24/18 01:00  03/24/18 07:00  03/24/18 01:00  03/24/18 01:00  03/24/18 01:00








 Intake & Output











 03/23/18 03/24/18 03/25/18





 06:59 06:59 06:59


 


Intake Total 1059 1840 


 


Output Total 2030 4725 


 


Balance -971 -288 


 


Weight  71.4 kg 











Exam: 








GENERAL: well-nourished and in no acute distress.  Alert and oriented x3


HEAD: Atraumatic, normocephalic.


EYES: Pupils equal round and reactive to light, extraocular movements intact, 

sclera anicteric, conjunctiva are normal.


ENT: TMs normal, nares patent, oropharynx clear without exudates. Moist mucous 

membranes.  No oral ulcerations or bleeding gums noted


NECK: supple without lymphadenopathy.  Trachea is central.  No cervical or 

axillary lymphadenopathy noted.  Carotids are 2+, JVD 8-9 cm


LUNGS: Respiration seems nonlabored, no significant accessory muscle action 

noted.  Bibasilar fine crackles noted. No wheezes rales or rhonchi noted.  No 

significant dullness noted on percussion.


CHEST: Palpation of the chest wall shows no significant chest wall tenderness.  

No other significant abnormalities noted.


HEART: [place] NP, No PSH,  1/6 ANDREW aortic area, 1/6 pan systolic murmur mitral 

area, no rubs, no gallops.


ABDOMEN: Soft, no significant tenderness appreciated, normoactive bowel sounds. 

No guarding, no rebound.  No rigidity noted . No masses appreciated.


EXTREMITIES: Pedal pulses are 1-2+, no calf tenderness noted. No clubbing or 

cyanosis.trace to 1+ pedal edema noted


NEUROLOGICAL: Focused neurological exam showed no significant neurologic 

deficit. Normal speech, no focal weakness appreciated. 


PSYCH: Normal mood, normal affect.  Judgment and insight within normal limits.


SKIN: No significant ecchymosis, skin is noted to be warm.


MUSCULOSKELETAL EXAM: No significant acute joint swelling noted.





Results


Laboratory Results: 


 





 03/24/18 05:12 





 03/24/18 05:12 





 











  03/24/18 03/24/18





  05:12 05:12


 


WBC  11.7 H 


 


RBC  3.37 L 


 


Hgb  9.0 L 


 


Hct  27.3 L 


 


MCV  81 


 


MCH  26.7 L 


 


MCHC  32.9 


 


RDW  15.5 H 


 


Plt Count  523 H 


 


Seg Neutrophils %  73.4 


 


Lymphocytes %  13.2 


 


Monocytes %  12.8 


 


Eosinophils %  0.4 


 


Basophils %  0.2 


 


Absolute Neutrophils  8.6 H 


 


Absolute Lymphocytes  1.5 


 


Absolute Monocytes  1.5 H 


 


Absolute Eosinophils  0.0 


 


Absolute Basophils  0.0 


 


Sodium   138.6


 


Potassium   3.5 L


 


Chloride   101


 


Carbon Dioxide   26


 


Anion Gap   12


 


BUN   16


 


Creatinine   1.05


 


Est GFR ( Amer)   > 60


 


Est GFR (Non-Af Amer)   > 60


 


Glucose   96


 


Calcium   8.9


 


Magnesium   1.8


 


Total Bilirubin   0.2


 


AST   19


 


ALT   25


 


Alkaline Phosphatase   98


 


Total Protein   5.5 L


 


Albumin   3.0 L








 





03/21/18 14:40   Sputum   Gram Stain - Final


03/21/18 14:40   Sputum   Sputum Culture - Final


                            Pseudomonas Aeruginosa


                            Normal [place]





 











  03/20/18 03/21/18 03/21/18





  19:05 01:02 07:50


 


Troponin I  0.062  0.056  0.044


 


NT-Pro-B Natriuret Pep   














  03/21/18





  07:50


 


Troponin I 


 


NT-Pro-B Natriuret Pep  1270 H











EKG Comments: 





Telemetry strip shows sinus rhythm without any sustained tachycardia or 

bradycardia.


Impressions: 


 





Chest CT  03/20/18 00:00


IMPRESSION:  Multicentric pneumonia most prominently involving the right lower 

lobe.  Mild mediastinal adenopathy.


 








Chest X-Ray  03/24/18 00:00


IMPRESSION:  A chronic increased interstitial markings from pulmonary fibrosis.

  No gross acute changes.  Stable cardiomegaly


 














Assessment & Plan





- Diagnosis


(1) Acute respiratory failure with hypoxia


Is this a current diagnosis for this admission?: Yes   





(2) Diabetes


Qualifiers: 


   Diabetes mellitus type: type 2   Diabetes mellitus long term insulin use: 

unspecified long term insulin use status   Diabetes mellitus complication status

: with unspecified complications   Qualified Code(s): E11.8 - Type 2 diabetes 

mellitus with unspecified complications   


Is this a current diagnosis for this admission?: Yes   





(3) Interstitial pneumonia


Is this a current diagnosis for this admission?: Yes   





(4) CHF (congestive heart failure)


Qualifiers: 


   Heart failure type: right-sided   Heart failure chronicity: acute on chronic

   Qualified Code(s): I50.813 - Acute on chronic right heart failure   


Is this a current diagnosis for this admission?: Yes   





(5) Elevated troponin I level


Is this a current diagnosis for this admission?: Yes   





- Notes


Notes: 


Generally improved and stable.  Patient does need tertiary care in view of lung 

problem which is being managed at [facility].  Cardiac wise apart 

from CHF which is felt to be right-sided, no significant cardiac dysrhythmia 

noted.  Patient does respond to IV diuretics.


Acute respiratory failure: Improved.  Patient on some nasal cannula oxygen 

supplementation.  Most likely related to pneumonia and interstitial lungs 

disease.  Continue with antibiotic therapy, oxygenation, may consider bilevel 

therapy etc.


CHF: Patient felt to have right-sided CHF.  Improved.  Patient getting 

additional dose of IV Lasix.  Patient has peripheral edema, improved.  Continue 

current diuretic therapy.


Interstitial pneumonia: Patient on antibiotic therapy directed by [facility] physicians and also hospitalist here.


Diabetes: Continue current management plans.


Elevated troponin I: Most likely related to hypoxemia and pneumonia.  Do not 

feel patient has acute coronary syndrome.  


Will sign off as patient is going to [facility].  Please 

reconsult if needed.











- Time


Time with patient: 15-25 minutes - CODE STATUS was discussed, patient remains 

full code.  Surrogate decision-maker patient's wife.  Multiple medical problems 

were addressed.  More than 50% of the time spent coordinating care, discussing 

management plans with involved caregivers.  Management plans discussed with 

involved personnels.  Medical decision making was of moderate to high complexity

, patient's has multiple  comorbidities.


Medications reviewed and adjusted accordingly: Yes

## 2018-03-24 NOTE — RADIOLOGY REPORT (SQ)
EXAM DESCRIPTION:  CHEST SINGLE VIEW



COMPLETED DATE/TIME:  3/24/2018 9:51 am



REASON FOR STUDY:  shortness of breath



COMPARISON:  Chest films 5/27/2014, 3/8/2018, 3/20/2018, 3/23/2018

CT chest 3/20/2018



EXAM PARAMETERS:  NUMBER OF VIEWS: One view.

TECHNIQUE: Single frontal radiographic view of the chest acquired.

RADIATION DOSE: NA

LIMITATIONS: None.



FINDINGS:  LUNGS AND PLEURA: Low lung volumes.  Chronic increased interstitial markings bilaterally f
rom pulmonary fibrosis.

No acute infiltrates.  No pleural effusion or pneumothorax.

MEDIASTINUM AND HILAR STRUCTURES: No masses.  Contour normal.

HEART AND VASCULAR STRUCTURES: Moderate cardiomegaly

BONES: No acute findings.

HARDWARE: None in the chest.

OTHER: No other significant finding.



IMPRESSION:  A chronic increased interstitial markings from pulmonary fibrosis.  No gross acute knight
es.  Stable cardiomegaly



TECHNICAL DOCUMENTATION:  JOB ID:  [id number]

 2011 [employer] All Rights Reserved



Reading location - IP/workstation name: [email or web address]

## 2018-03-24 NOTE — PDOC PROGRESS REPORT
Subjective


Progress Note for:: 03/24/18


Subjective:: 





Pt states that he is feeling better.  Pt states that he has not had any fevers.

  Pt states that he still is desatting with exertion. 


Reason For Visit: 


ACUTE AND CHRONIC HYPOXIC RESPIRATORY FAILURE,








Physical Exam


Vital Signs: 


 











Temp Pulse Resp BP Pulse Ox


 


 97.6 F   86   22 H  152/72 H  100 


 


 03/24/18 01:00  03/24/18 07:00  03/24/18 01:00  03/24/18 01:00  03/24/18 01:00








 Intake & Output











 03/23/18 03/24/18 03/25/18





 06:59 06:59 06:59


 


Intake Total 1059 1840 


 


Output Total 4516 5890 


 


Balance -977 -7986 


 


Weight  71.4 kg 











General appearance: PRESENT: mild distress, morbidly obese, well-developed, well

-nourished


Head exam: PRESENT: atraumatic, normocephalic


Eye exam: PRESENT: conjunctiva pink, EOMI.  ABSENT: scleral icterus


Ear exam: PRESENT: normal external ear exam


Mouth exam: PRESENT: moist, tongue midline


Neck exam: ABSENT: carotid bruit, JVD, lymphadenopathy, thyromegaly


Respiratory exam: PRESENT: accessory muscle use, decreased breath sounds, 

rhonchi.  ABSENT: rales, wheezes


Cardiovascular exam: PRESENT: RRR.  ABSENT: diastolic murmur, rubs, systolic 

murmur


Pulses: PRESENT: normal dorsalis pedis pul


Vascular exam: PRESENT: normal capillary refill


GI/Abdominal exam: PRESENT: normal bowel sounds, soft.  ABSENT: distended, 

guarding, mass, organolmegaly, rebound, tenderness


Rectal exam: PRESENT: deferred


Extremities exam: PRESENT: full ROM, other - Teds in place.  ABSENT: calf 

tenderness, clubbing, pedal edema


Musculoskeletal exam: PRESENT: full ROM


Neurological exam: PRESENT: alert, awake, oriented to person, oriented to place

, oriented to time, oriented to situation, CN II-XII grossly intact.  ABSENT: 

motor sensory deficit


Psychiatric exam: PRESENT: appropriate affect, normal mood.  ABSENT: homicidal 

ideation, suicidal ideation


Skin exam: PRESENT: dry, intact, warm.  ABSENT: cyanosis, rash





Results


Laboratory Results: 


 





 03/24/18 05:12 





 03/24/18 05:12 





 











  03/23/18 03/24/18 03/24/18





  11:43 05:12 05:12


 


WBC   11.7 H 


 


RBC   3.37 L 


 


Hgb   9.0 L 


 


Hct   27.3 L 


 


MCV   81 


 


MCH   26.7 L 


 


MCHC   32.9 


 


RDW   15.5 H 


 


Plt Count   523 H 


 


Seg Neutrophils %   73.4 


 


Lymphocytes %   13.2 


 


Monocytes %   12.8 


 


Eosinophils %   0.4 


 


Basophils %   0.2 


 


Absolute Neutrophils   8.6 H 


 


Absolute Lymphocytes   1.5 


 


Absolute Monocytes   1.5 H 


 


Absolute Eosinophils   0.0 


 


Absolute Basophils   0.0 


 


Sodium    138.6


 


Potassium    3.5 L


 


Chloride    101


 


Carbon Dioxide    26


 


Anion Gap    12


 


BUN    16


 


Creatinine  1.15   1.05


 


Est GFR ( Amer)  > 60   > 60


 


Est GFR (Non-Af Amer)  > 60   > 60


 


Glucose    96


 


Calcium    8.9


 


Magnesium    1.8


 


Total Bilirubin    0.2


 


AST    19


 


ALT    25


 


Alkaline Phosphatase    98


 


Total Protein    5.5 L


 


Albumin    3.0 L








 











  03/20/18 03/21/18 03/21/18





  19:05 01:02 07:50


 


Troponin I  0.062  0.056  0.044


 


NT-Pro-B Natriuret Pep   














  03/21/18





  07:50


 


Troponin I 


 


NT-Pro-B Natriuret Pep  1270 H











Impressions: 


 





Chest CT  03/20/18 00:00


IMPRESSION:  Multicentric pneumonia most prominently involving the right lower 

lobe.  Mild mediastinal adenopathy.


 








Chest X-Ray  03/23/18 06:00


IMPRESSION:


 


1. Stable appearance of the chest with bibasilar interstitial and


alveolar opacities.


 


 














Assessment & Plan





- Diagnosis


(1) Sepsis


Qualifiers: 


   Sepsis type: sepsis due to unspecified organism   Qualified Code(s): A41.9 - 

Sepsis, unspecified organism   


Is this a current diagnosis for this admission?: Yes   


Plan: 


Febrile, respiratory rate, tachypnea, identified source Secondary to Nosocomial 

Pneumonia: Vancomycin and Cefepime. Will continue Tamiflu. 








(2) Pulmonary hypertension


Is this a current diagnosis for this admission?: Yes   


Plan: 


Moderate pulmonary hypertension: Will give additional dose of Lasix 20 mg IV X 

1.  








(3) Acute on chronic diastolic congestive heart failure


Is this a current diagnosis for this admission?: Yes   


Plan: 


Will give lasix 20 mg IV X 1.  Will check CXR. 








(4) Pneumonia


Is this a current diagnosis for this admission?: Yes   


Plan: 


We will continue vancomycin and cefepime.  Patient placed on Bactrim for PCP








(5) Acute respiratory failure with hypoxia


Is this a current diagnosis for this admission?: Yes   


Plan: 


Pulmonary Consulted.  Patient on 5 L nasal cannula.  We will continue patient 

on antibiotics.








(6) Dyspnea


Is this a current diagnosis for this admission?: Yes   


Plan: 


Acute on chronic secondary to interstitial lung disease: Patient's 2D echo 

demonstrates pulmonary hypertension moderate and BMP slightly elevated.  Patient

's dyspnea is multifactorial.  Will give IV Lasix again today. 








(7) Morbid obesity


Is this a current diagnosis for this admission?: Yes   


Plan: 


Encourage dietary changes. 








(8) Hypokalemia


Is this a current diagnosis for this admission?: Yes   


Plan: 


Will give Potassium 40mEq PO X 1.   








(9) Diabetes


Qualifiers: 


   Diabetes mellitus type: type 2   Diabetes mellitus long term insulin use: 

unspecified long term insulin use status   Diabetes mellitus complication status

: with unspecified complications   Qualified Code(s): E11.8 - Type 2 diabetes 

mellitus with unspecified complications   


Is this a current diagnosis for this admission?: Yes   


Plan: 


Will continue on SSI. 








(10) Chronic hypersensitivity pneumonitis


Is this a current diagnosis for this admission?: Yes   


Plan: 


Will continue Cellcept and Steroids.








(11) DVT prophylaxis


Is this a current diagnosis for this admission?: Yes   


Plan: 


SCDs








- Time


Time Spent with patient: 15-24 minutes

## 2018-03-25 NOTE — PDOC PROGRESS REPORT
Subjective


Progress Note for:: 03/22/18


Subjective:: 





REMAINS SHORT OF BREATH W/MINIMAL ACTIVITY


Reason For Visit: 


ACUTE AND CHRONIC HYPOXIC RESPIRATORY FAILURE,








Physical Exam


Vital Signs: 


 











Temp Pulse Resp BP Pulse Ox


 


 97.7 F   67   23 H  142/80 H  94 


 


 03/22/18 08:00  03/22/18 08:25  03/22/18 08:00  03/22/18 08:00  03/22/18 08:00








 Intake & Output











 03/21/18 03/22/18 03/23/18





 06:59 06:59 06:59


 


Intake Total 700 2197 


 


Output Total 850 3000 


 


Balance -150 -803 


 


Weight 111.9 kg 111.8 kg 











General appearance: PRESENT: no acute distress, cooperative, disheveled, thin


Head exam: PRESENT: atraumatic, normocephalic


Eye exam: PRESENT: conjunctiva pale, EOMI


Mouth exam: PRESENT: dry mucosa, neck supple, tongue midline


Neck exam: ABSENT: carotid bruit, JVD, lymphadenopathy, thyromegaly, tracheal 

deviation, tracheostomy


Respiratory exam: PRESENT: decreased breath sounds, prolonged expiratory phas, 

rhonchi, symmetrical, unlabored


Cardiovascular exam: PRESENT: RRR, +S1, +S2


Pulses: PRESENT: normal radial pulses


GI/Abdominal exam: PRESENT: diminished bowel sounds, soft


Gentrourinary exam: PRESENT: indwelling catheter


Extremities exam: ABSENT: clubbing


Musculoskeletal exam: ABSENT: ambulatory, deformity, dislocation


Neurological exam: PRESENT: alert, awake


Psychiatric exam: PRESENT: normal mood


Skin exam: PRESENT: dry, warm





Results


Laboratory Results: 


 





 03/22/18 03:55 





 03/22/18 03:55 





 











  03/22/18 03/22/18





  03:55 03:55


 


WBC  14.1 H 


 


RBC  3.21 L 


 


Hgb  8.6 L 


 


Hct  26.2 L 


 


MCV  82 


 


MCH  26.9 L 


 


MCHC  32.9 


 


RDW  15.3 H 


 


Plt Count  403 


 


Seg Neutrophils %  Not Reportable 


 


Lymphocytes %  Not Reportable 


 


Monocytes %  Not Reportable 


 


Eosinophils %  Not Reportable 


 


Basophils %  Not Reportable 


 


Absolute Neutrophils  Not Reportable 


 


Absolute Lymphocytes  Not Reportable 


 


Absolute Monocytes  Not Reportable 


 


Absolute Eosinophils  Not Reportable 


 


Absolute Basophils  Not Reportable 


 


Sodium   140.5


 


Potassium   3.7


 


Chloride   106


 


Carbon Dioxide   21 L


 


Anion Gap   14


 


BUN   14


 


Creatinine   0.87


 


Est GFR ( Amer)   > 60


 


Est GFR (Non-Af Amer)   > 60


 


Glucose   136 H


 


Calcium   8.7


 


Total Bilirubin   0.2


 


AST   20


 


ALT   33


 


Alkaline Phosphatase   110


 


Total Protein   5.3 L


 


Albumin   2.8 L








 





03/20/18 23:30   Sputum   Gram Stain - Final


03/20/18 23:30   Sputum   Sputum Culture - Final





 











  03/20/18 03/21/18 03/21/18





  19:05 01:02 07:50


 


Troponin I  0.062  0.056  0.044


 


NT-Pro-B Natriuret Pep   














  03/21/18





  07:50


 


Troponin I 


 


NT-Pro-B Natriuret Pep  1270 H











Impressions: 


 





Chest CT  03/20/18 00:00


IMPRESSION:  Multicentric pneumonia most prominently involving the right lower 

lobe.  Mild mediastinal adenopathy.


 








Chest X-Ray  03/20/18 14:21


IMPRESSION:  Chronic bandlike scarring right mid lung


New alveolar and interstitial infiltrates at both lung bases, worrisome for 

alveolar and interstitial edema.  Pneumonia could not entirely be excluded


 














Assessment & Plan





- Diagnosis


(1) Acute respiratory failure with hypoxia


Is this a current diagnosis for this admission?: Yes   


Plan: 


UNCHANGED








(2) Chronic hypersensitivity pneumonitis


Is this a current diagnosis for this admission?: Yes   


Plan: 


STEROIDS








(3) Dyspnea


Qualifiers: 


   Dyspnea type: dyspnea on exertion   Qualified Code(s): R06.09 - Other forms 

of dyspnea   


Is this a current diagnosis for this admission?: Yes   


Plan: 


MIN EXERTION








(4) Pulmonary hypertension


Is this a current diagnosis for this admission?: Yes   


Plan: 


STABLE

## 2018-03-25 NOTE — PDOC CONSULTATION
Consultation


Consult Date: 03/21/18


Attending physician:: ABBEY COONEY


Consult reason:: RESP DISTRESS





History of Present Illness


Admission Date/PCP: 


  03/20/18 15:29





  





History of Present Illness: 


SHALINI PARKER is a 62 year old male presents to hospital with complaint of 

shortness of breath and cough for the last several days.  Wife reports that 

patient was seen at [facility] by Dr. Rivera who placed patient on CellCept and 

decrease patient's steroids.  Patient states that by the end of January he 

began having more respiratory issues.  Wife reports that patient's breathing 

continued to worsen into February and recently they came to the emergency room 

for evaluation.  Wife reports that they were told that was most likely due to 

allergies and patient was sent home from the emergency department.  Family 

represented today because of patient not feeling better.  Wife also reported 

that patient has been febrile at home.  Wife reports that temperature has been 

from 100.2F -103.6. Pt states that sat decreased to 75% with exertion. [facility] 

pulmonary service who recommended that patient be transferred to their facility 

but due to them being on diversion recommended that patient stay here in our 

hospital until bed is available.  [facility] pulmonary doctor Matthew recommended that 

patient be placed on Bactrim for PCP treatment, steroids, and continue with 

current antibiotics.








Past Medical History


Endocrine Medical History: Reports: Diabetes Mellitus Type 2 - neuropathy


GI Medical History: Reports: Gastroesophageal Reflux Disease


Musculoskeltal Medical History: Reports: Arthritis





Past Surgical History


Past Surgical History: Reports: Cardiac Catheterization





Social History


Lives with: Family


Smoking Status: Never Smoker


Frequency of Alcohol Use: None


Hx Recreational Drug Use: No


Drugs: None


Hx Prescription Drug Abuse: No





- Advance Directive


Resuscitation Status: Do Not Resuscitate





Family History


Parental Family History Reviewed: No


Children Family History Reviewed: No


Sibling(s) Family History Reviewed.: No





Medication/Allergy


Home Medications: 








Albuterol Sulfate [Proair HFA] 2 puff IH Q4HP PRN 03/20/18 


Baclofen [Baclofen 10 mg Tablet] 10 mg PO BID 03/20/18 


Ferrous Sulfate [Iron] 325 mg PO NOON 03/20/18 


Fluoxetine HCl [Prozac 20 mg Capsule] 20 mg PO QHS 03/20/18 


Fluticasone Propionate [Flonase Allergy Relief] 2 spray NASL DAILY 03/20/18 


Furosemide [Lasix 20 mg Tablet] 20 mg PO QAM 03/20/18 


Gabapentin [Neurontin] 300 mg PO NOON 03/20/18 


Insulin Aspart [Novolog Flexpen] 20 unit SUBCUT BIDACBL 03/20/18 


Insulin Aspart [Novolog Flexpen] 22 unit SUBCUT WSUPPER 03/20/18 


Insulin Glargine,Hum.rec.anlog [Lantus Insulin 100 Unit/mL] 24 unit SUBCUT QHS 

03/20/18 


Metformin HCl [Metformin HCl ER] 1,000 mg PO BID 03/20/18 


Montelukast Sodium [Singulair 10 mg Tablet] 10 mg PO QHS 03/20/18 


Mycophenolate Mofetil [Cellcept 250 mg Capsule] 1,000 mg PO Q12 03/20/18 


Pantoprazole Sodium [Protonix] 40 mg PO BID 03/20/18 


Pravastatin Sodium [Pravachol] 20 mg PO QHS 03/20/18 


Prednisone [Deltasone 10 mg Tablet] 10 mg PO NOON 03/20/18 


Sulfamethoxazole/Trimethoprim [Bactrim 400-80 mg Tablet] 2 tab PO MOWEFR@1000 03 /20/18 


Tiotropium Bromide [Spiriva Handihaler 18 mcg/dose (30 Dose)] 1 cap IH DAILY 03/ 20/18 








Allergies/Adverse Reactions: 


 





No Known Allergies Allergy (Verified 03/20/18 13:24)


 











Review of Systems


Constitutional: PRESENT: anorexia, fatigue, weakness.  ABSENT: night sweats, 

weight gain


Eyes: ABSENT: visual disturbances


Ears: ABSENT: hearing changes


Nose, Mouth, and Throat: ABSENT: mouth pain, sore throat


Cardiovascular: ABSENT: orthropnea, palpitations


Gastrointestinal: ABSENT: coffee ground emesis, dysphagia, heartburn, 

hematemesis, hematochezia, melena


Genitourinary: ABSENT: dysuria, hematuria


Musculoskeletal: ABSENT: joint swelling


Integumentary: ABSENT: pruritus, rash


Neurological: ABSENT: abnormal speech, confusion, frequent falls, lack of 

coordination, memory loss


Psychiatric: ABSENT: hallucinations, homidical ideation, suicidal ideation


Endocrine: ABSENT: cold intolerance, heat intolerance, menstrual abnormalities, 

polydipsia, polyuria


Hematologic/Lymphatic: PRESENT: easy bruising





Physical Exam


Vital Signs: 


 











Temp Pulse Resp BP Pulse Ox


 


 98.7 F   100   30 H  152/79 H  97 


 


 03/21/18 07:48  03/21/18 07:53  03/21/18 07:48  03/21/18 07:48  03/21/18 07:48








 Intake & Output











 03/20/18 03/21/18 03/22/18





 06:59 06:59 06:59


 


Intake Total  700 237


 


Output Total  850 500


 


Balance  -150 -263


 


Weight  111.9 kg 











General appearance: PRESENT: no acute distress, cooperative, disheveled, thin


Head exam: PRESENT: atraumatic, normocephalic


Eye exam: PRESENT: conjunctiva pale, EOMI.  ABSENT: nystagmus, periorbital 

swelling, scleral icterus


Mouth exam: PRESENT: dry mucosa, neck supple, tongue midline


Neck exam: ABSENT: carotid bruit, JVD, lymphadenopathy, thyromegaly, tracheal 

deviation, tracheostomy


Respiratory exam: PRESENT: crackles, decreased breath sounds, prolonged 

expiratory phas, rhonchi, symmetrical, unlabored.  ABSENT: rales, retraction, 

stridor, tachypnea


Cardiovascular exam: PRESENT: RRR, +S1, +S2, tachycardia


Pulses: PRESENT: normal radial pulses


GI/Abdominal exam: PRESENT: diminished bowel sounds, soft


Extremities exam: ABSENT: clubbing, joint swelling


Musculoskeletal exam: ABSENT: ambulatory, deformity, dislocation


Neurological exam: PRESENT: alert, awake


Psychiatric exam: PRESENT: flat affect


Skin exam: PRESENT: dry, warm





Results


Laboratory Results: 


 





 03/21/18 07:50 





 03/21/18 07:50 





 











  03/20/18 03/21/18 03/21/18





  21:15 07:50 07:50


 


WBC   13.1 H 


 


RBC   3.20 L 


 


Hgb   8.8 L 


 


Hct   26.2 L 


 


MCV   82 


 


MCH   27.4 


 


MCHC   33.4 


 


RDW   15.4 H 


 


Plt Count   423 


 


Seg Neutrophils %   Not Reportable 


 


Lymphocytes %   Not Reportable 


 


Monocytes %   Not Reportable 


 


Eosinophils %   Not Reportable 


 


Basophils %   Not Reportable 


 


Absolute Neutrophils   Not Reportable 


 


Absolute Lymphocytes   Not Reportable 


 


Absolute Monocytes   Not Reportable 


 


Absolute Eosinophils   Not Reportable 


 


Absolute Basophils   Not Reportable 


 


Carbonic Acid  0.96 L  


 


HCO3/H2CO3 Ratio  23:1  


 


ABG pH  7.46 H  


 


ABG pCO2  31.8 L  


 


ABG pO2  82.6  


 


ABG HCO3  22.1  


 


ABG O2 Saturation  96.8  


 


ABG Base Excess  -1.1  


 


FiO2  5L  


 


Sodium    141.9


 


Potassium    3.6


 


Chloride    110 H


 


Carbon Dioxide    23


 


Anion Gap    9


 


BUN    9


 


Creatinine    0.91


 


Est GFR ( Amer)    > 60


 


Est GFR (Non-Af Amer)    > 60


 


Glucose    142 H


 


Calcium    8.2 L


 


Magnesium    1.5 L


 


Total Bilirubin    0.6


 


AST    19


 


ALT    28


 


Alkaline Phosphatase    97


 


Total Protein    5.2 L


 


Albumin    2.6 L








 











  03/20/18 03/21/18 03/21/18





  19:05 01:02 07:50


 


Troponin I  0.062  0.056 


 


NT-Pro-B Natriuret Pep    1270 H











Impressions: 


 





Chest CT  03/20/18 00:00


IMPRESSION:  Multicentric pneumonia most prominently involving the right lower 

lobe.  Mild mediastinal adenopathy.


 








Chest X-Ray  03/20/18 14:21


IMPRESSION:  Chronic bandlike scarring right mid lung


New alveolar and interstitial infiltrates at both lung bases, worrisome for 

alveolar and interstitial edema.  Pneumonia could not entirely be excluded


 














Assessment & Plan





- Diagnosis


(1) Acute on chronic diastolic congestive heart failure


Is this a current diagnosis for this admission?: Yes   


Plan: 


PER PCP








(2) Acute respiratory failure with hypoxia


Is this a current diagnosis for this admission?: Yes   


Plan: 


STABLE








(3) Chronic hypersensitivity pneumonitis


Is this a current diagnosis for this admission?: Yes   


Plan: 


STEROIDS








(4) Dyspnea


Qualifiers: 


   Dyspnea type: dyspnea on exertion   Qualified Code(s): R06.09 - Other forms 

of dyspnea   


Is this a current diagnosis for this admission?: Yes   


Plan: 


MIN EXERTION








(5) Pneumonia


Qualifiers: 


   Pneumonia type: due to unspecified organism   Laterality: right   Lung 

location: lower lobe of lung   Qualified Code(s): J18.1 - Lobar pneumonia, 

unspecified organism   





(6) Pulmonary hypertension


Is this a current diagnosis for this admission?: Yes   


Plan: 


STABLE
Consultation


Consult Date: 03/21/18


Attending physician:: ABBEY COONEY


Consult reason:: Respiratory distress





History of Present Illness


Admission Date/PCP: 


  03/20/18 15:29





  





Patient complains of: Shortness of breath


History of Present Illness: 


SHALINI PARKER is a 62 year old male presents to hospital with complaint of 

shortness of breath and cough for the last several days.  Wife reports that 

patient was seen at [facility] by Dr. Rivera who placed patient on CellCept and 

decrease patient's steroids.  Patient states that by the end of January he 

began having more respiratory issues.  Wife reports that patient's breathing 

continued to worsen into February and recently they came to the emergency room 

for evaluation.  Wife reports that they were told that was most likely due to 

allergies and patient was sent home from the emergency department.  Family 

represented today because of patient not feeling better.  Wife also reported 

that patient has been febrile at home.  Wife reports that temperature has been 

from 100.2F -103.6. Pt states that sat decreased to 75% with exertion. 





Conference call was held with the ER hospitalist and [facility] pulmonary service who 

recommended that patient be transferred to their facility but due to them being 

on diversion recommended that patient stay here in our hospital until bed is 

available.  [facility] pulmonary doctor Matthew recommended that patient be placed on 

Bactrim for PCP treatment, steroids, and continue with current antibiotics.





This history was reviewed and confirmed.  On repeated questioning patient 

denied any chest pain.  He denied any history of blockages but describes 

history of chronic interstitial lung disease.  Patient is on waiting list for 

transfer to [facility].  A 2D echo which was ordered and 

completed was reviewed.  Findings discussed with patient family.





Past Medical History


Pulmonary Medical History: Reports: Chronic Obstructive Pulmonary Disease (COPD)

, Other - Interstitial lung disease


Endocrine Medical History: Reports: Diabetes Mellitus Type 2 - neuropathy


GI Medical History: Reports: Gastroesophageal Reflux Disease


Musculoskeltal Medical History: Reports: Arthritis





Past Surgical History


Past Surgical History: Reports: Cardiac Catheterization





Social History


Information Source: Patient


Lives with: Family


Smoking Status: Never Smoker


Frequency of Alcohol Use: None


Hx Recreational Drug Use: No


Drugs: None


Hx Prescription Drug Abuse: No





- Advance Directive


Resuscitation Status: Do Not Resuscitate


Surrogate healthcare decision maker:: 





Patient's wife is the surrogate decision-maker





Family History


Family History: Reviewed & Not Pertinent


Parental Family History Reviewed: Yes


Children Family History Reviewed: Yes


Sibling(s) Family History Reviewed.: Yes





Medication/Allergy


Home Medications: 








Albuterol Sulfate [Proair HFA] 2 puff IH Q4HP PRN 03/20/18 


Baclofen [Baclofen 10 mg Tablet] 10 mg PO BID 03/20/18 


Ferrous Sulfate [Iron] 325 mg PO NOON 03/20/18 


Fluoxetine HCl [Prozac 20 mg Capsule] 20 mg PO QHS 03/20/18 


Fluticasone Propionate [Flonase Allergy Relief] 2 spray NASL DAILY 03/20/18 


Furosemide [Lasix 20 mg Tablet] 20 mg PO QAM 03/20/18 


Gabapentin [Neurontin] 300 mg PO NOON 03/20/18 


Insulin Aspart [Novolog Flexpen] 20 unit SUBCUT BIDACBL 03/20/18 


Insulin Aspart [Novolog Flexpen] 22 unit SUBCUT WSUPPER 03/20/18 


Insulin Glargine,Hum.rec.anlog [Lantus Insulin 100 Unit/mL] 24 unit SUBCUT QHS 

03/20/18 


Metformin HCl [Metformin HCl ER] 1,000 mg PO BID 03/20/18 


Montelukast Sodium [Singulair 10 mg Tablet] 10 mg PO QHS 03/20/18 


Mycophenolate Mofetil [Cellcept 250 mg Capsule] 1,000 mg PO Q12 03/20/18 


Pantoprazole Sodium [Protonix] 40 mg PO BID 03/20/18 


Pravastatin Sodium [Pravachol] 20 mg PO QHS 03/20/18 


Prednisone [Deltasone 10 mg Tablet] 10 mg PO NOON 03/20/18 


Sulfamethoxazole/Trimethoprim [Bactrim 400-80 mg Tablet] 2 tab PO MOWEFR@1000 03 /20/18 


Tiotropium Bromide [Spiriva Handihaler 18 mcg/dose (30 Dose)] 1 cap IH DAILY 03/ 20/18 








Allergies/Adverse Reactions: 


 





No Known Allergies Allergy (Verified 03/20/18 13:24)


 











Review of Systems


Review of Systems: 





Please see history of present illness and past medical history as wall.


Constitutional: fever or chills reported.


Head : No recent chronic headaches, recent head injury.


Eyes: No recent eye pain, diplopia, redness, discharge, acute visual changes.


Ears: No recent chronic ear pain, acute hearing loss, ear discharge.


Oral cavity: No recent  ulcerations, bleeding, oral cavity discomfort.


Neck: No recent acute neck pain reported.


Hematologic: No recent easy bruising or bleeding or hematologic malignancy 

reported.


Lymphatic: No recent lymphatic malignancy, chronic lymphadenopathy reported yet


Cardiovascular system review: See history of present illness.


Respiratory system review: Recent cough and congestion reported but denies 

hemoptysis, blood clots in the lungs reported.  Severe shortness of breath on 

exertion


Gastrointestinal system review: Negative for any recent acute or chronic 

abdominal pain, hematemesis, melena, recent change in bowel habits.  


Genitourinary system review: No recent acute or chronic hematuria, flank pain, 

UTI etc. reported.


Skin system review: Negative for any recent abnormal bruising, no rash, no 

pruritus reported.


Neurologic: No prior history of strokes, mini strokes, seizure disorder.


Psychologic: No history of major psychosis or major depression reported.


Musculoskeletal: Minor aches and pains reported.  No acute joint swelling 

reported.


Endocrine: No recent polyuria, polydipsia, recent heat or cold intolerance.





Physical Exam


Vital Signs: 


 











Temp Pulse Resp BP Pulse Ox


 


 97.6 F   95   30 H  115/58 L  97 


 


 03/21/18 19:56  03/21/18 18:00  03/21/18 18:00  03/21/18 18:00  03/21/18 18:00








 Intake & Output











 03/20/18 03/21/18 03/22/18





 06:59 06:59 06:59


 


Intake Total  700 1417


 


Output Total  850 1550


 


Balance  -150 -133


 


Weight  111.9 kg 











Exam: 








GENERAL: well-nourished and in no acute distress.  Alert and oriented x3


HEAD: Atraumatic, normocephalic.


EYES: Pupils equal round and reactive to light, extraocular movements intact, 

sclera anicteric, conjunctiva are normal.


ENT: TMs normal, nares patent, oropharynx clear without exudates. Moist mucous 

membranes.  No oral ulcerations or bleeding gums noted


NECK: supple without lymphadenopathy.  Trachea is central.  No cervical or 

axillary lymphadenopathy noted.  Carotids are 2+, JVD 8-10 cm


LUNGS: Respiration seems nonlabored, no significant accessory muscle action 

noted.  Bibasilar fine crackles noted. No wheezes rales or rhonchi noted.  Few 

a scattered significant dullness noted on percussion.


CHEST: Palpation of the chest wall shows no significant chest wall tenderness.  

No other significant abnormalities noted.


HEART: [place] NP, No PSH,  1/6 ANDREW aortic area, 1/6 pan systolic murmur mitral 

area, no rubs, no gallops.


ABDOMEN: Soft, no significant tenderness appreciated, normoactive bowel sounds. 

No guarding, no rebound.  No rigidity noted . No masses appreciated.


EXTREMITIES: Pedal pulses are 1-2+, no calf tenderness noted. No clubbing or 

cyanosis. 1+ pedal edema noted


NEUROLOGICAL: Focused neurological exam showed no significant neurologic 

deficit. Normal speech, no focal weakness appreciated. 


PSYCH: Normal mood, normal affect.  Judgment and insight within normal limits.


SKIN: No significant ecchymosis, skin is noted to be warm.


MUSCULOSKELETAL EXAM: No significant acute joint swelling noted.





Results


Laboratory Results: 


 





 03/21/18 07:50 





 03/21/18 07:50 





 











  03/20/18 03/21/18 03/21/18





  21:15 07:50 07:50


 


WBC   13.1 H 


 


RBC   3.20 L 


 


Hgb   8.8 L 


 


Hct   26.2 L 


 


MCV   82 


 


MCH   27.4 


 


MCHC   33.4 


 


RDW   15.4 H 


 


Plt Count   423 


 


Seg Neutrophils %   Not Reportable 


 


Lymphocytes %   Not Reportable 


 


Monocytes %   Not Reportable 


 


Eosinophils %   Not Reportable 


 


Basophils %   Not Reportable 


 


Absolute Neutrophils   Not Reportable 


 


Absolute Lymphocytes   Not Reportable 


 


Absolute Monocytes   Not Reportable 


 


Absolute Eosinophils   Not Reportable 


 


Absolute Basophils   Not Reportable 


 


Carbonic Acid  0.96 L  


 


HCO3/H2CO3 Ratio  23:1  


 


ABG pH  7.46 H  


 


ABG pCO2  31.8 L  


 


ABG pO2  82.6  


 


ABG HCO3  22.1  


 


ABG O2 Saturation  96.8  


 


ABG Base Excess  -1.1  


 


FiO2  5L  


 


Sodium    141.9


 


Potassium    3.6


 


Chloride    110 H


 


Carbon Dioxide    23


 


Anion Gap    9


 


BUN    9


 


Creatinine    0.91


 


Est GFR ( Amer)    > 60


 


Est GFR (Non-Af Amer)    > 60


 


Glucose    142 H


 


Calcium    8.2 L


 


Magnesium    1.5 L


 


Total Bilirubin    0.6


 


AST    19


 


ALT    28


 


Alkaline Phosphatase    97


 


Total Protein    5.2 L


 


Albumin    2.6 L








 





03/20/18 23:30   Sputum   Gram Stain - Final


03/20/18 23:30   Sputum   Sputum Culture - Final





 











  03/20/18 03/21/18 03/21/18





  19:05 01:02 07:50


 


Troponin I  0.062  0.056  0.044


 


NT-Pro-B Natriuret Pep   














  03/21/18





  07:50


 


Troponin I 


 


NT-Pro-B Natriuret Pep  1270 H











EKG Comments: 





Sinus tachycardia, right ventricular hypertrophy, minor nonspecific ST-T wave 

changes


Impressions: 


 





Chest CT  03/20/18 00:00


IMPRESSION:  Multicentric pneumonia most prominently involving the right lower 

lobe.  Mild mediastinal adenopathy.


 








Chest X-Ray  03/20/18 14:21


IMPRESSION:  Chronic bandlike scarring right mid lung


New alveolar and interstitial infiltrates at both lung bases, worrisome for 

alveolar and interstitial edema.  Pneumonia could not entirely be excluded


 














Assessment & Plan





- Diagnosis


(1) Acute respiratory failure with hypoxia


Is this a current diagnosis for this admission?: Yes   





(2) Diabetes


Qualifiers: 


   Diabetes mellitus type: type 2   Diabetes mellitus long term insulin use: 

unspecified long term insulin use status   Diabetes mellitus complication status

: with unspecified complications   Qualified Code(s): E11.8 - Type 2 diabetes 

mellitus with unspecified complications   


Is this a current diagnosis for this admission?: Yes   





(3) Interstitial pneumonia


Is this a current diagnosis for this admission?: Yes   





(4) CHF (congestive heart failure)


Qualifiers: 


   Heart failure type: right-sided   Heart failure chronicity: acute on chronic

   Qualified Code(s): I50.813 - Acute on chronic right heart failure   


Is this a current diagnosis for this admission?: Yes   





(5) Elevated troponin I level


Is this a current diagnosis for this admission?: Yes   





- Notes


Notes: 





Acute respiratory failure: Most likely related to pneumonia and institution 

lungs disease.  Continue with antibiotic therapy, oxygenation, may consider 

bilevel therapy etc.


CHF: Patient felt to have right-sided CHF.  JVP is borderline elevated.  BNP is 

elevated.  Patient has peripheral edema.  Continue current diuretic therapy.


Interstitial pneumonia: Patient on antibiotic therapy directed by [facility] physicians and also hospitalist here.


Diabetes: Continue current management plans.


Elevated troponin I: Most likely related to hypoxemia and pneumonia.  Do not 

feel patient has acute coronary syndrome.  However will repeat an EKG in the 

morning to look for any evolving changes.





- Time


Time Spent: 30 to 50 Minutes - CODE STATUS was discussed, patient remains full 

code.  Surrogate decision-maker unchanged.  Multiple medical problems were 

addressed.  More than 50% of the time spent coordinating care, discussing 

management plans with involved caregivers.  Management plans discussed with 

involved personnels.  Medical decision making was of moderate to high complexity

, patient's has multiple  comorbidities.


Medications reviewed and adjusted accordingly: Yes
normal...

## 2018-03-25 NOTE — PDOC PROGRESS REPORT
Subjective


Progress Note for:: 03/23/18


Subjective:: 





REMAINS SHORT OF BREATH W/MINIMAL ACTIVITY


Reason For Visit: 


ACUTE AND CHRONIC HYPOXIC RESPIRATORY FAILURE,








Physical Exam


Vital Signs: 


 











Temp Pulse Resp BP Pulse Ox


 


 97.6 F   92   23 H  156/80 H  92 


 


 03/23/18 04:00  03/23/18 04:35  03/23/18 06:00  03/23/18 04:17  03/23/18 06:00








 Intake & Output











 03/22/18 03/23/18 03/24/18





 06:59 06:59 06:59


 


Intake Total 2197 1059 


 


Output Total 3000 2030 250


 


Balance -803 -971 -250


 


Weight 111.8 kg  











General appearance: PRESENT: no acute distress, cooperative, disheveled, thin


Head exam: PRESENT: atraumatic, normocephalic


Eye exam: PRESENT: conjunctiva pale, EOMI.  ABSENT: nystagmus, periorbital 

swelling, scleral icterus


Mouth exam: PRESENT: dry mucosa, neck supple, tongue midline


Neck exam: ABSENT: carotid bruit, JVD, lymphadenopathy, thyromegaly, tracheal 

deviation, tracheostomy


Respiratory exam: PRESENT: decreased breath sounds, prolonged expiratory phas, 

rales, rhonchi, symmetrical, unlabored.  ABSENT: retraction, stridor, tachypnea


Cardiovascular exam: PRESENT: RRR, +S1, +S2, systolic murmur


Pulses: PRESENT: normal radial pulses


GI/Abdominal exam: PRESENT: diminished bowel sounds, soft


Gentrourinary exam: ABSENT: indwelling catheter


Extremities exam: ABSENT: clubbing


Musculoskeletal exam: ABSENT: deformity, dislocation


Neurological exam: PRESENT: alert, awake


Skin exam: PRESENT: dry, warm





Results


Laboratory Results: 


 





 03/23/18 04:48 





 03/23/18 04:48 





 











  03/23/18 03/23/18 03/23/18





  04:48 04:48 06:20


 


WBC  13.8 H  


 


RBC  3.59 L  


 


Hgb  9.4 L  


 


Hct  28.9 L  


 


MCV  81  


 


MCH  26.2 L  


 


MCHC  32.5  


 


RDW  15.8 H  


 


Plt Count  504 H  


 


Seg Neutrophils %  78.1 H  


 


Lymphocytes %  11.6 L  


 


Monocytes %  10.1  


 


Eosinophils %  0.1  


 


Basophils %  0.1  


 


Absolute Neutrophils  10.8 H  


 


Absolute Lymphocytes  1.6  


 


Absolute Monocytes  1.4  


 


Absolute Eosinophils  0.0  


 


Absolute Basophils  0.0  


 


Carbonic Acid    1.10


 


HCO3/H2CO3 Ratio    20:1


 


ABG pH    7.40


 


ABG pCO2    36.6


 


ABG pO2    81.7


 


ABG HCO3    22.2


 


ABG O2 Saturation    96.1


 


ABG Base Excess    -2.2


 


FiO2    5L


 


Sodium   141.1 


 


Potassium   3.7 


 


Chloride   103 


 


Carbon Dioxide   22 


 


Anion Gap   16 


 


BUN   17 


 


Creatinine   1.02 


 


Est GFR ( Amer)   > 60 


 


Est GFR (Non-Af Amer)   > 60 


 


Glucose   102 


 


Calcium   9.1 


 


Magnesium   1.8 


 


Total Bilirubin   0.2 


 


AST   21 


 


ALT   34 


 


Alkaline Phosphatase   108 


 


Total Protein   5.9 L 


 


Albumin   3.2 L 








 











  03/20/18 03/21/18 03/21/18





  19:05 01:02 07:50


 


Troponin I  0.062  0.056  0.044


 


NT-Pro-B Natriuret Pep   














  03/21/18





  07:50


 


Troponin I 


 


NT-Pro-B Natriuret Pep  1270 H











Impressions: 


 





Chest CT  03/20/18 00:00


IMPRESSION:  Multicentric pneumonia most prominently involving the right lower 

lobe.  Mild mediastinal adenopathy.


 








Chest X-Ray  03/23/18 06:00


IMPRESSION:


 


1. Stable appearance of the chest with bibasilar interstitial and


alveolar opacities.


 


 














Assessment & Plan





- Diagnosis


(1) Acute respiratory failure with hypoxia


Is this a current diagnosis for this admission?: Yes   


Plan: 


UNCHANGED








(2) Chronic hypersensitivity pneumonitis


Is this a current diagnosis for this admission?: Yes   


Plan: 


STEROIDS








(3) Dyspnea


Qualifiers: 


   Dyspnea type: dyspnea on exertion   Qualified Code(s): R06.09 - Other forms 

of dyspnea   


Is this a current diagnosis for this admission?: Yes   


Plan: 


MIN EXERTION








(4) Pulmonary hypertension


Is this a current diagnosis for this admission?: Yes   


Plan: 


STABLE

## 2018-04-21 ENCOUNTER — HOSPITAL ENCOUNTER (EMERGENCY)
Dept: HOSPITAL 62 - ER | Age: 63
LOS: 1 days | Discharge: HOME | End: 2018-04-22
Payer: OTHER GOVERNMENT

## 2018-04-21 VITALS — SYSTOLIC BLOOD PRESSURE: 126 MMHG | DIASTOLIC BLOOD PRESSURE: 74 MMHG

## 2018-04-21 DIAGNOSIS — R05: ICD-10-CM

## 2018-04-21 DIAGNOSIS — R06.02: ICD-10-CM

## 2018-04-21 DIAGNOSIS — M79.1: ICD-10-CM

## 2018-04-21 DIAGNOSIS — J67.9: Primary | ICD-10-CM

## 2018-04-21 DIAGNOSIS — E11.9: ICD-10-CM

## 2018-04-21 DIAGNOSIS — Z79.01: ICD-10-CM

## 2018-04-21 DIAGNOSIS — J44.9: ICD-10-CM

## 2018-04-21 DIAGNOSIS — R07.89: ICD-10-CM

## 2018-04-21 LAB
ALBUMIN SERPL-MCNC: 4 G/DL (ref 3.5–5)
ALP SERPL-CCNC: 125 U/L (ref 38–126)
ALT SERPL-CCNC: 25 U/L (ref 21–72)
ANION GAP SERPL CALC-SCNC: 14 MMOL/L (ref 5–19)
AST SERPL-CCNC: 22 U/L (ref 17–59)
BILIRUB DIRECT SERPL-MCNC: 0.3 MG/DL (ref 0–0.4)
BILIRUB SERPL-MCNC: 0.3 MG/DL (ref 0.2–1.3)
BUN SERPL-MCNC: 18 MG/DL (ref 7–20)
CALCIUM: 9.4 MG/DL (ref 8.4–10.2)
CHLORIDE SERPL-SCNC: 100 MMOL/L (ref 98–107)
CK SERPL-CCNC: 60 U/L (ref 55–170)
CO2 SERPL-SCNC: 29 MMOL/L (ref 22–30)
GLUCOSE SERPL-MCNC: 175 MG/DL (ref 75–110)
POTASSIUM SERPL-SCNC: 4.1 MMOL/L (ref 3.6–5)
PROT SERPL-MCNC: 7.2 G/DL (ref 6.3–8.2)
SODIUM SERPL-SCNC: 143.1 MMOL/L (ref 137–145)

## 2018-04-21 PROCEDURE — 36415 COLL VENOUS BLD VENIPUNCTURE: CPT

## 2018-04-21 PROCEDURE — 93005 ELECTROCARDIOGRAM TRACING: CPT

## 2018-04-21 PROCEDURE — 99285 EMERGENCY DEPT VISIT HI MDM: CPT

## 2018-04-21 PROCEDURE — 96374 THER/PROPH/DIAG INJ IV PUSH: CPT

## 2018-04-21 PROCEDURE — 83880 ASSAY OF NATRIURETIC PEPTIDE: CPT

## 2018-04-21 PROCEDURE — 82553 CREATINE MB FRACTION: CPT

## 2018-04-21 PROCEDURE — 84484 ASSAY OF TROPONIN QUANT: CPT

## 2018-04-21 PROCEDURE — 71275 CT ANGIOGRAPHY CHEST: CPT

## 2018-04-21 PROCEDURE — 82550 ASSAY OF CK (CPK): CPT

## 2018-04-21 PROCEDURE — 87040 BLOOD CULTURE FOR BACTERIA: CPT

## 2018-04-21 PROCEDURE — 85025 COMPLETE CBC W/AUTO DIFF WBC: CPT

## 2018-04-21 PROCEDURE — 82803 BLOOD GASES ANY COMBINATION: CPT

## 2018-04-21 PROCEDURE — 71045 X-RAY EXAM CHEST 1 VIEW: CPT

## 2018-04-21 PROCEDURE — 80053 COMPREHEN METABOLIC PANEL: CPT

## 2018-04-21 PROCEDURE — 93010 ELECTROCARDIOGRAM REPORT: CPT

## 2018-04-21 NOTE — ER DOCUMENT REPORT
ED General





- General


Chief Complaint: Shortness Of Breath


Stated Complaint: SHORTNESS OF BREATH


Time Seen by Provider: 04/21/18 22:53


Mode of Arrival: Wheelchair


Information source: Patient, Relative, [facility] Records


Notes: 





63-year-old male history of interstitial lung disease who is on Bactrim 3 times 

a week prophylactically and was seen by myself a few weeks prior for 

respiratory distress and admitted noted to have pneumonia as well as a DVT in 

the left leg started on Xarelto presents with complaints of left-sided rib 

pain.  Patient believes this pain is from coughing, notes that he is on 

anywhere between 6-15 L of nasal cannula at home, he states at rest it is at 6 

with ambulation goes to 10 with physical therapy he requires 15,





Currently patient denies any shortness of breath


TRAVEL OUTSIDE OF THE [place] IN LAST 30 DAYS: No





- HPI


Onset: Other - 3-4 weeks duration


Onset/Duration: Waxing and waning


Quality of pain: Achy, Sharp


Severity: Mild


Pain Level: 1


Associated symptoms: Body/muscle aches, Productive cough - Improved but still 

green, Shortness of breath


Exacerbated by: Coughing


Relieved by: Denies


Similar symptoms previously: Yes


Recently seen / treated by doctor: Yes





- Related Data


Allergies/Adverse Reactions: 


 





No Known Allergies Allergy (Verified 03/20/18 13:24)


 











Past Medical History





- Social History


Smoking Status: Never Smoker


Cigarette use (# per day): No


Chew tobacco use (# tins/day): No


Smoking Education Provided: No


Family History: Reviewed & Not Pertinent


Pulmonary Medical History: Reports: Hx COPD


Endocrine Medical History: Reports: Hx Diabetes Mellitus Type 2 - neuropathy


Renal/ Medical History: Denies: Hx Peritoneal Dialysis


GI Medical History: Reports: Hx Gastroesophageal Reflux Disease


Musculoskeltal Medical History: Reports Hx Arthritis


Past Surgical History: Reports: Hx Cardiac Catheterization, Hx Cardiac Surgery 

- ablasion





- Immunizations


Immunizations up to date: Yes


Hx Diphtheria, Pertussis, Tetanus Vaccination: Yes





Review of Systems





- Review of Systems


Notes: 





REVIEW OF SYSTEMS:


CONSTITUTIONAL :  Denies fever,  chills, or sweats.  Denies recent illness.


EENT:   Denies eye, ear, throat, or mouth pain or symptoms.  Denies nasal or 

sinus congestion or discharge.  Denies throat, tongue, or mouth swelling or 

difficulty swallowing.


CARDIOVASCULAR:  Denies chest pain.  Denies palpitations or racing or irregular 

heart beat.  Denies ankle edema.


RESPIRATORY: Admits to cough shortness of breath productive green left-sided 

rib pain.


GASTROINTESTINAL:  Denies abdominal pain or distention.  Denies nausea, vomiting

, or diarrhea.  Denies blood in vomitus, stools, or per rectum.  Denies black, 

tarry stools.  Denies constipation.  


GENITOURINARY:  Denies difficulty urinating, painful urination, burning, 

frequency, blood in urine, or discharge.


MUSCULOSKELETAL:  Denies back or neck pain or stiffness.  Denies joint pain or 

swelling.


SKIN:   Denies rash, lesions or sores.


HEMATOLOGIC :   Denies easy bruising or bleeding.


LYMPHATIC:  Denies swollen, enlarged glands.


NEUROLOGICAL:  Denies confusion or altered mental status.  Denies passing out 

or loss of consciousness.  Denies dizziness or lightheadedness.  Denies 

headache.  Denies weakness or paralysis or loss of use of either side.  Denies 

problems with gait or speech.  Denies sensory loss, numbness, or tingling.  

Denies seizures.


PSYCHIATRIC:  Denies anxiety or stress.  Denies depression, suicidal ideation, 

or homicidal ideation.





ALL OTHER SYSTEMS REVIEWED AND NEGATIVE.





Dictation was performed using Dragon voice recognition software 





PHYSICAL EXAMINATION:





GENERAL: Patient appears much better than previous visit





HEAD: Atraumatic, normocephalic.





EYES: Pupils equal round and reactive to light, extraocular movements intact, 

sclera anicteric, conjunctiva are normal.





ENT: Nares patent, oropharynx clear without exudates.  Moist mucous membranes.





NECK: Normal range of motion, supple without lymphadenopathy





LUNGS: Crackles at the bilateral bases tenderness upon palpation left lower ribs





HEART: Regular rate and rhythm without murmurs





ABDOMEN: Soft, nontender, nondistended abdomen.  No guarding, no rebound.  No 

masses appreciated.





Musculoskeletal: Normal range of motion, no pitting or edema.  No cyanosis.





NEUROLOGICAL: Cranial nerves grossly intact.  Normal speech, normal gait.  

Normal sensory, motor exams 





PSYCH: Normal mood, normal affect.





SKIN: Warm, Dry, normal turgor, no rashes or lesions noted.





Physical Exam





- Vital signs


Vitals: 


 











Temp Pulse Resp BP Pulse Ox


 


 97.7 F   104 H  28 H  152/69 H  95 


 


 04/21/18 22:46  04/21/18 22:46  04/21/18 22:46  04/21/18 22:46  04/21/18 22:46














Course





- Re-evaluation


Re-evalutation: 





04/21/18 23:09


I believe the patient's pain may be secondary to his coughing, however given 

history of DVT a CTA has been ordered


04/22/18 03:51


CTA was performed, no pulmonary emboli is noted improved aeration of the lungs 

are noted therefore I have very low suspicion that this is anything life-

threatening and I believe this is all secondary to his cough as I previously 

expected.  Patient sided well here on his baseline 6 L and cannula, he looks 

much better than his previous presentation therefore I believe he is stable for 

discharge patient is very happy with this plan








After performing a Medical Screening Examination, I estimate there is LOW risk 

for ACUTE CORONARY SYNDROME, PULMONARY EMBOLI, RESPIRATORY FAILURE, SEPSIS OR 

MENINGITIS, thus I consider the discharge disposition reasonable.  I have 

reevaluated this patient multiple times and no significant life threatening 

changes are noted. The patient and I have discussed the diagnosis and risks, 

and we agree with discharging home with close follow-up. We also discussed 

returning to the Emergency Department immediately if new or worsening symptoms 

occur. We have discussed the symptoms which are most concerning (e.g., changing 

or worsening pain, trouble swallowing or breathing, neck stiffness, fever) that 

necessitate immediate return.





- Vital Signs


Vital signs: 


 











Temp Pulse Resp BP Pulse Ox


 


 97.7 F   104 H  24 H  126/74 H  96 


 


 04/21/18 22:46  04/21/18 22:46  04/21/18 23:31  04/21/18 23:31  04/21/18 23:31














- Laboratory


Result Diagrams: 


 04/22/18 00:19





 04/21/18 23:07


Laboratory results interpreted by me: 


 











  04/21/18 04/22/18





  23:07 00:19


 


RBC   3.46 L


 


Hgb   9.8 L


 


Hct   29.4 L


 


RDW   18.3 H


 


Glucose  175 H 














- Diagnostic Test


Radiology reviewed: Image reviewed - CTA chest noted improved aeration with no 

pulmonary emboli, Reports reviewed





Discharge





- Discharge


Clinical Impression: 


 Chest wall pain, Chronic hypersensitivity pneumonitis





Condition: Stable


Disposition: HOME, SELF-CARE


Instructions:  Chest Wall Pain ([facility])


Additional Instructions: 


Follow up with your physician tomorrow for further care or return to the ED 

IMMEDIATELY if symptoms worsen or new concerns occur. If you cannot afford to 

follow up with your primary care physician a list of low cost clinics have been 

provided at the end of your discharge papers as well.


Prescriptions: 


Hydrocodone Bit/Homatropine [Hycodan Syrup 5-1.5 mg/5 ml Ud Cup] 5 ml PO Q4HP 

PRN #120 ml


 PRN Reason: 


Lidocaine [Lidoderm 5% (700 mg) Transdermal Patch] 1 patch TP DAILY #30 

adh..patch

## 2018-04-22 LAB
ADD MANUAL DIFF: NO
BASE EXCESS BLDV CALC-SCNC: 3.5 MMOL/L
BASOPHILS # BLD AUTO: 0.1 10^3/UL (ref 0–0.2)
BASOPHILS NFR BLD AUTO: 0.7 % (ref 0–2)
CK MB SERPL-MCNC: 1.24 NG/ML (ref ?–4.55)
EOSINOPHIL # BLD AUTO: 0.3 10^3/UL (ref 0–0.6)
EOSINOPHIL NFR BLD AUTO: 2.8 % (ref 0–6)
ERYTHROCYTE [DISTWIDTH] IN BLOOD BY AUTOMATED COUNT: 18.3 % (ref 11.5–14)
HCO3 BLDV-SCNC: 28.6 MMOL/L (ref 20–32)
HCT VFR BLD CALC: 29.4 % (ref 37.9–51)
HGB BLD-MCNC: 9.8 G/DL (ref 13.5–17)
LYMPHOCYTES # BLD AUTO: 1.5 10^3/UL (ref 0.5–4.7)
LYMPHOCYTES NFR BLD AUTO: 14.6 % (ref 13–45)
MCH RBC QN AUTO: 28.2 PG (ref 27–33.4)
MCHC RBC AUTO-ENTMCNC: 33.2 G/DL (ref 32–36)
MCV RBC AUTO: 85 FL (ref 80–97)
MONOCYTES # BLD AUTO: 1.3 10^3/UL (ref 0.1–1.4)
MONOCYTES NFR BLD AUTO: 12.7 % (ref 3–13)
NEUTROPHILS # BLD AUTO: 7 10^3/UL (ref 1.7–8.2)
NEUTS SEG NFR BLD AUTO: 69.2 % (ref 42–78)
NT PRO BNP: 96 PG/ML (ref 5–900)
PCO2 BLDV: 45.8 MMHG (ref 35–63)
PH BLDV: 7.41 [PH] (ref 7.3–7.42)
PLATELET # BLD: 361 10^3/UL (ref 150–450)
RBC # BLD AUTO: 3.46 10^6/UL (ref 4.35–5.55)
TOTAL CELLS COUNTED % (AUTO): 100 %
TROPONIN I SERPL-MCNC: < 0.012 NG/ML
WBC # BLD AUTO: 10.1 10^3/UL (ref 4–10.5)

## 2018-04-22 NOTE — RADIOLOGY REPORT (SQ)
EXAM DESCRIPTION: CHEST SINGLE VIEW



CLINICAL HISTORY: hypoxemia



COMPARISON: 3/24/2018



FINDINGS: Single frontal view of the chest.  Tortuosity of the

thoracic aorta. Heart is not enlarged. Bilateral scattered

interstitial opacities predominantly involving the mid and lower

lung zones are stable. No acute opacities identified. No

pneumothorax.  No acute osseous abnormalities.  Upper abdominal

soft tissues are unremarkable.



IMPRESSION:



1. No acute pulmonary process identified. Chronic interstitial

opacities are stable.

## 2018-04-22 NOTE — RADIOLOGY REPORT (SQ)
EXAM DESCRIPTION: CTA of the chest per PE protocol with contrast.



CLINICAL HISTORY: hx dvt, resp distress



COMPARISON: The 20 2018



TECHNIQUE: CTA of the chest obtained following the uncomplicated

intravenous administration of 82 mL Isovue-370. 3-D/MIP

reformatted images of the chest available for evaluation.



DLP:  1197.16 mGycm



FINDINGS: 



Chest:

Pulmonary arteries: Contrast bolus is adequate.No filling defects

identified in the pulmonary arteries to suggest pulmonary

embolus.

Thyroid:No abnormalities of the visualized thyroid.

Great Vessels: Atherosclerotic calcification of the aorta.

Thoracic Aorta:No abnormalities of the thoracic aorta identified.

Heart: Cardiac megaly. No definite pericardial effusion or

calcified coronary artery atherosclerosis.

Lymph Nodes:No enlarged mediastinal lymph nodes identified.

Esophagus: Moderate hiatal hernia.

Other:No additional findings.



Lungs: Bilateral interstitial prominence, bronchial wall

thickening and mild basilar bronchiectasis with improved aeration

from the comparison CT from 3/20/2018 and scattered groundglass

opacities.

Pleura:No pleural effusion or pneumothorax.

Trachea/Airways:No abnormalities of the visualized trachea or

airways.



Bones:No destructive osseous lesions.



Upper Abdomen:Limited images of the upper abdomen demonstrate no

definite abnormalities of visualized portions of the liver,

gallbladder, pancreas, spleen, adrenal glands, or kidneys.







IMPRESSION: 



1. No pulmonary embolus identified.



2. Improved aeration of the lungs from the comparison CT from

3/20/2018 with findings compatible with chronic interstitial lung

disease/fibrosis. Given persistent scattered groundglass

opacities acute/developing pneumonic process cannot be excluded.

Continued radiographic follow-up recommended.



3. Cardiomegaly.



4. Moderate hiatal hernia.





This exam was performed according to our departmental

dose-optimization program, which includes automated exposure

control, adjustment of the mA and/or kV according to patient size

and/or use of iterative reconstruction technique.

## 2018-04-22 NOTE — EKG REPORT
SEVERITY:- ABNORMAL ECG -

SINUS TACHYCARDIA

NONSPECIFIC INTRAVENTRICULAR CONDUCTION DELAY

CONSIDER POSTERIOR INFARCT

:

Confirmed by: Cathy Hernandez 22-Apr-2018 09:44:04

## 2019-11-18 ENCOUNTER — HOSPITAL ENCOUNTER (OUTPATIENT)
Dept: HOSPITAL 62 - END | Age: 64
Discharge: HOME | End: 2019-11-18
Attending: INTERNAL MEDICINE
Payer: OTHER GOVERNMENT

## 2019-11-18 VITALS — DIASTOLIC BLOOD PRESSURE: 63 MMHG | SYSTOLIC BLOOD PRESSURE: 126 MMHG

## 2019-11-18 DIAGNOSIS — Z12.11: Primary | ICD-10-CM

## 2019-11-18 DIAGNOSIS — D12.6: ICD-10-CM

## 2019-11-18 DIAGNOSIS — D64.9: ICD-10-CM

## 2019-11-18 DIAGNOSIS — K57.30: ICD-10-CM

## 2019-11-18 DIAGNOSIS — Z86.010: ICD-10-CM

## 2019-11-18 DIAGNOSIS — D12.3: ICD-10-CM

## 2019-11-18 DIAGNOSIS — K52.9: ICD-10-CM

## 2019-11-18 DIAGNOSIS — K29.50: ICD-10-CM

## 2019-11-18 DIAGNOSIS — K64.8: ICD-10-CM

## 2019-11-18 PROCEDURE — 88305 TISSUE EXAM BY PATHOLOGIST: CPT

## 2019-11-18 PROCEDURE — 00813 ANES UPR LWR GI NDSC PX: CPT

## 2019-11-18 PROCEDURE — 43239 EGD BIOPSY SINGLE/MULTIPLE: CPT

## 2019-11-18 PROCEDURE — 88342 IMHCHEM/IMCYTCHM 1ST ANTB: CPT

## 2019-11-18 PROCEDURE — 45380 COLONOSCOPY AND BIOPSY: CPT

## 2019-11-18 PROCEDURE — 45385 COLONOSCOPY W/LESION REMOVAL: CPT

## 2019-11-18 NOTE — OPERATIVE REPORT
Operative Report


DATE OF SURGERY: 11/18/19


Operative Report: 





The risks, benefits and alternatives of the procedure including the risk of 

bleeding, perforation requiring surgery were explained to the patient in detail 

and informed consent was obtained.  Patient is taken back to the endoscopy suite

and placed in a left, lateral decubital position.  Timeout was called.  Propofol

medication is administered.  Rectal examination is done which did not reveal any

masses, tears or fissures.  An Olympus videoscope was introduced into the 

patient's rectum.  Scope was then carefully advanced all the way to the cecum.  

Cecum was identified by the usual anatomical landmarks including the ileocecal 

valve as well as the appendiceal office.  Photodocumentation is obtained.  Scope

was then sequentially pulled back via the various segments of the colon 

including the ascending colon, hepatic flexure, transverse colon, splenic 

flexure, descending colon and finally into the rectosigmoid portions of the 

colon.  Retroflexion maneuvers performed.


The risks benefits and alternatives of the procedure explained to the patient in

detail and informed consent is obtained.A GIF Olympus video scope was inserted 

into the patient's mouth and hypopharynx, the esophagus is identified intubated 

and insufflated ,the scope was then advanced through the esophagus stomach and 

duodenum, retroflexion maneuver is done, the esophagus stomach and first and 

second portions of the duodenum examined


PREOPERATIVE DIAGNOSIS: Anemia rule out GI bleed


POSTOPERATIVE DIAGNOSIS: Random biopsies right colon rule out microscopic 

colitis.  Colon polyp hepatic flexure removed via snare polypectomy and 

retrieved.  two  Polyps transverse colon removed via snare polypectomy and 

retrieved.  All of these polyps roughly 5 to 6 mm in size.  Diverticulosis 

without any evidence of diverticulitis.  Internal hemorrhoids.  gastritis status

post biopsy without Helicobacter pylori.  No upper or lower GI bleeding noted.


OPERATION: Colonoscopy with snare polypectomy.  Colonoscopy with biopsy.  EGD 

with biopsy


SURGEON: CHEPE VELASQUEZ


ANESTHESIA: LMAC


TISSUE REMOVED OR ALTERED: As noted above.


COMPLICATIONS: 





None.


ESTIMATED BLOOD LOSS: None.


INTRAOPERATIVE FINDINGS: As noted above.


PROCEDURE: 





Patient tolerated the procedure well.


No immediate postprocedure complications are noted.


Patient is discharged in good condition.


Discharge date 11/18/2019.


Discharge diet: Regular.


Discharge activity: Regular.


2 to 3-week follow-up to discuss findings.


Patient is instructed to call the office or proceed to the emergency room should

there be any further problems or questions.


Wait on the pathology.


3-year surveillance colonoscopy.


Patient is cleared to undergo lung surgery from the GI standpoint.